# Patient Record
Sex: FEMALE | Race: WHITE | NOT HISPANIC OR LATINO | Employment: FULL TIME | URBAN - METROPOLITAN AREA
[De-identification: names, ages, dates, MRNs, and addresses within clinical notes are randomized per-mention and may not be internally consistent; named-entity substitution may affect disease eponyms.]

---

## 2019-08-21 ENCOUNTER — APPOINTMENT (OUTPATIENT)
Dept: CT IMAGING | Facility: CLINIC | Age: 24
End: 2019-08-21
Attending: EMERGENCY MEDICINE
Payer: COMMERCIAL

## 2019-08-21 ENCOUNTER — HOSPITAL ENCOUNTER (EMERGENCY)
Facility: CLINIC | Age: 24
Discharge: HOME | End: 2019-08-21
Attending: EMERGENCY MEDICINE | Admitting: EMERGENCY MEDICINE
Payer: COMMERCIAL

## 2019-08-21 VITALS
TEMPERATURE: 96.5 F | SYSTOLIC BLOOD PRESSURE: 118 MMHG | OXYGEN SATURATION: 100 % | DIASTOLIC BLOOD PRESSURE: 65 MMHG | HEART RATE: 68 BPM | WEIGHT: 195 LBS | RESPIRATION RATE: 16 BRPM
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
## 2019-08-21 PROCEDURE — 70450 CT HEAD/BRAIN W/O DYE: CPT

## 2019-08-21 PROCEDURE — 99284 EMERGENCY DEPT VISIT MOD MDM: CPT | Mod: 25 | Performed by: EMERGENCY MEDICINE

## 2019-08-21 PROCEDURE — 72125 CT NECK SPINE W/O DYE: CPT

## 2019-08-21 PROCEDURE — 99284 EMERGENCY DEPT VISIT MOD MDM: CPT | Mod: Z6 | Performed by: EMERGENCY MEDICINE

## 2019-08-21 RX ORDER — CLONAZEPAM 1 MG/1
1 TABLET ORAL DAILY PRN
COMMUNITY
End: 2024-04-25

## 2019-08-21 RX ORDER — CETIRIZINE HYDROCHLORIDE 10 MG/1
10 TABLET ORAL DAILY
COMMUNITY

## 2019-08-21 RX ORDER — MONTELUKAST SODIUM 10 MG/1
10 TABLET ORAL AT BEDTIME
COMMUNITY
End: 2024-04-25

## 2019-08-21 RX ORDER — ALBUTEROL SULFATE 90 UG/1
2 AEROSOL, METERED RESPIRATORY (INHALATION) EVERY 6 HOURS PRN
COMMUNITY

## 2019-08-21 RX ORDER — SERTRALINE HYDROCHLORIDE 100 MG/1
100 TABLET, FILM COATED ORAL DAILY
COMMUNITY
End: 2022-06-13

## 2019-08-21 ASSESSMENT — ENCOUNTER SYMPTOMS
ABDOMINAL PAIN: 0
ARTHRALGIAS: 0
DIFFICULTY URINATING: 0
LIGHT-HEADEDNESS: 1
SHORTNESS OF BREATH: 0
DIZZINESS: 0
CONFUSION: 0
NECK PAIN: 1
EYE REDNESS: 0
NECK STIFFNESS: 1
HEADACHES: 1
COLOR CHANGE: 0
FEVER: 0

## 2019-08-21 NOTE — DISCHARGE INSTRUCTIONS
Please make an appointment to follow up with [facility] (phone: [phone]) as soon as possible even if entirely better.  Return to the ED if you develop any numbness/tingling/weakness in your extremities or any worsening neck pain.
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
## 2019-09-09 ENCOUNTER — PATIENT OUTREACH (OUTPATIENT)
Dept: NEUROSURGERY | Facility: CLINIC | Age: 24
End: 2019-09-09
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
## 2019-09-09 NOTE — PROGRESS NOTES
OSMANI Wallace  called @  2103 stating patient canceled appointment, wanting to be seen sooner and calling another clinic.     Callback to patient, offered appointment with TABITHA Real tomorrow.  Pt refuses appointment states wants to see an MD.    Pt again has my name and number if needed.

## 2020-03-02 ENCOUNTER — HOSPITAL ENCOUNTER (EMERGENCY)
Facility: CLINIC | Age: 25
Discharge: HOME | End: 2020-03-02
Attending: FAMILY MEDICINE | Admitting: FAMILY MEDICINE
Payer: COMMERCIAL

## 2020-03-02 VITALS
SYSTOLIC BLOOD PRESSURE: 126 MMHG | TEMPERATURE: 99.3 F | HEART RATE: 73 BPM | DIASTOLIC BLOOD PRESSURE: 80 MMHG | RESPIRATION RATE: 18 BRPM | OXYGEN SATURATION: 98 %

## 2020-03-02 DIAGNOSIS — A49.8 SHIGA TOXIN-PRODUCING ESCHERICHIA COLI INFECTION: ICD-10-CM

## 2020-03-02 LAB
ALBUMIN SERPL-MCNC: 3.9 G/DL (ref 3.4–5)
ALBUMIN UR-MCNC: NEGATIVE MG/DL
ALP SERPL-CCNC: 68 U/L (ref 40–150)
ALT SERPL W P-5'-P-CCNC: 27 U/L (ref 0–50)
ANION GAP SERPL CALCULATED.3IONS-SCNC: 5 MMOL/L (ref 3–14)
APPEARANCE UR: CLEAR
APTT PPP: 30 SEC (ref 22–37)
AST SERPL W P-5'-P-CCNC: 14 U/L (ref 0–45)
BACTERIA #/AREA URNS HPF: ABNORMAL /HPF
BASOPHILS # BLD AUTO: 0 10E9/L (ref 0–0.2)
BASOPHILS NFR BLD AUTO: 0.1 %
BILIRUB SERPL-MCNC: 0.4 MG/DL (ref 0.2–1.3)
BILIRUB UR QL STRIP: NEGATIVE
BUN SERPL-MCNC: 6 MG/DL (ref 7–30)
CALCIUM SERPL-MCNC: 8.8 MG/DL (ref 8.5–10.1)
CHLORIDE SERPL-SCNC: 106 MMOL/L (ref 94–109)
CO2 SERPL-SCNC: 29 MMOL/L (ref 20–32)
COLOR UR AUTO: ABNORMAL
CREAT SERPL-MCNC: 0.67 MG/DL (ref 0.52–1.04)
DIFFERENTIAL METHOD BLD: NORMAL
EOSINOPHIL # BLD AUTO: 0.1 10E9/L (ref 0–0.7)
EOSINOPHIL NFR BLD AUTO: 0.8 %
ERYTHROCYTE [DISTWIDTH] IN BLOOD BY AUTOMATED COUNT: 12.4 % (ref 10–15)
GFR SERPL CREATININE-BSD FRML MDRD: >90 ML/MIN/{1.73_M2}
GLUCOSE SERPL-MCNC: 86 MG/DL (ref 70–99)
GLUCOSE UR STRIP-MCNC: NEGATIVE MG/DL
HCG UR QL: NEGATIVE
HCT VFR BLD AUTO: 42.2 % (ref 35–47)
HGB BLD-MCNC: 14.3 G/DL (ref 11.7–15.7)
HGB UR QL STRIP: NEGATIVE
IMM GRANULOCYTES # BLD: 0 10E9/L (ref 0–0.4)
IMM GRANULOCYTES NFR BLD: 0.2 %
INR PPP: 1.02 (ref 0.86–1.14)
KETONES UR STRIP-MCNC: NEGATIVE MG/DL
LEUKOCYTE ESTERASE UR QL STRIP: ABNORMAL
LYMPHOCYTES # BLD AUTO: 1.4 10E9/L (ref 0.8–5.3)
LYMPHOCYTES NFR BLD AUTO: 15.4 %
MCH RBC QN AUTO: 30.6 PG (ref 26.5–33)
MCHC RBC AUTO-ENTMCNC: 33.9 G/DL (ref 31.5–36.5)
MCV RBC AUTO: 90 FL (ref 78–100)
MONOCYTES # BLD AUTO: 0.9 10E9/L (ref 0–1.3)
MONOCYTES NFR BLD AUTO: 9.7 %
NEUTROPHILS # BLD AUTO: 6.7 10E9/L (ref 1.6–8.3)
NEUTROPHILS NFR BLD AUTO: 73.8 %
NITRATE UR QL: NEGATIVE
NRBC # BLD AUTO: 0 10*3/UL
NRBC BLD AUTO-RTO: 0 /100
PH UR STRIP: 5.5 PH (ref 5–7)
PLATELET # BLD AUTO: 208 10E9/L (ref 150–450)
POTASSIUM SERPL-SCNC: 3.9 MMOL/L (ref 3.4–5.3)
PROT SERPL-MCNC: 7.2 G/DL (ref 6.8–8.8)
RBC # BLD AUTO: 4.68 10E12/L (ref 3.8–5.2)
RBC #/AREA URNS AUTO: 2 /HPF (ref 0–2)
SODIUM SERPL-SCNC: 140 MMOL/L (ref 133–144)
SOURCE: ABNORMAL
SP GR UR STRIP: 1 (ref 1–1.03)
SQUAMOUS #/AREA URNS AUTO: 3 /HPF (ref 0–1)
UROBILINOGEN UR STRIP-MCNC: NORMAL MG/DL (ref 0–2)
WBC # BLD AUTO: 9.1 10E9/L (ref 4–11)
WBC #/AREA URNS AUTO: 2 /HPF (ref 0–5)

## 2020-03-02 PROCEDURE — 25000128 H RX IP 250 OP 636: Performed by: FAMILY MEDICINE

## 2020-03-02 PROCEDURE — 99285 EMERGENCY DEPT VISIT HI MDM: CPT | Mod: Z6 | Performed by: FAMILY MEDICINE

## 2020-03-02 PROCEDURE — 85730 THROMBOPLASTIN TIME PARTIAL: CPT | Performed by: FAMILY MEDICINE

## 2020-03-02 PROCEDURE — 25000132 ZZH RX MED GY IP 250 OP 250 PS 637: Performed by: FAMILY MEDICINE

## 2020-03-02 PROCEDURE — 96376 TX/PRO/DX INJ SAME DRUG ADON: CPT | Performed by: FAMILY MEDICINE

## 2020-03-02 PROCEDURE — 81025 URINE PREGNANCY TEST: CPT | Performed by: FAMILY MEDICINE

## 2020-03-02 PROCEDURE — 96375 TX/PRO/DX INJ NEW DRUG ADDON: CPT | Performed by: FAMILY MEDICINE

## 2020-03-02 PROCEDURE — 96361 HYDRATE IV INFUSION ADD-ON: CPT | Performed by: FAMILY MEDICINE

## 2020-03-02 PROCEDURE — 99285 EMERGENCY DEPT VISIT HI MDM: CPT | Mod: 25 | Performed by: FAMILY MEDICINE

## 2020-03-02 PROCEDURE — 80053 COMPREHEN METABOLIC PANEL: CPT | Performed by: FAMILY MEDICINE

## 2020-03-02 PROCEDURE — 81001 URINALYSIS AUTO W/SCOPE: CPT | Performed by: FAMILY MEDICINE

## 2020-03-02 PROCEDURE — 85025 COMPLETE CBC W/AUTO DIFF WBC: CPT | Performed by: FAMILY MEDICINE

## 2020-03-02 PROCEDURE — 96374 THER/PROPH/DIAG INJ IV PUSH: CPT | Performed by: FAMILY MEDICINE

## 2020-03-02 PROCEDURE — 85610 PROTHROMBIN TIME: CPT | Performed by: FAMILY MEDICINE

## 2020-03-02 PROCEDURE — 25800030 ZZH RX IP 258 OP 636: Performed by: FAMILY MEDICINE

## 2020-03-02 RX ORDER — HYDROMORPHONE HYDROCHLORIDE 1 MG/ML
0.3 INJECTION, SOLUTION INTRAMUSCULAR; INTRAVENOUS; SUBCUTANEOUS ONCE
Status: COMPLETED | OUTPATIENT
Start: 2020-03-02 | End: 2020-03-02

## 2020-03-02 RX ORDER — HYDROCODONE BITARTRATE AND ACETAMINOPHEN 5; 325 MG/1; MG/1
1 TABLET ORAL EVERY 4 HOURS PRN
Qty: 12 TABLET | Refills: 0 | Status: SHIPPED | OUTPATIENT
Start: 2020-03-02 | End: 2020-03-05

## 2020-03-02 RX ORDER — SODIUM CHLORIDE 9 MG/ML
1000 INJECTION, SOLUTION INTRAVENOUS CONTINUOUS
Status: DISCONTINUED | OUTPATIENT
Start: 2020-03-02 | End: 2020-03-03 | Stop reason: HOSPADM

## 2020-03-02 RX ORDER — HYDROMORPHONE HYDROCHLORIDE 1 MG/ML
0.5 INJECTION, SOLUTION INTRAMUSCULAR; INTRAVENOUS; SUBCUTANEOUS ONCE
Status: COMPLETED | OUTPATIENT
Start: 2020-03-02 | End: 2020-03-02

## 2020-03-02 RX ORDER — ONDANSETRON 2 MG/ML
4 INJECTION INTRAMUSCULAR; INTRAVENOUS ONCE
Status: COMPLETED | OUTPATIENT
Start: 2020-03-02 | End: 2020-03-02

## 2020-03-02 RX ORDER — HYDROCODONE BITARTRATE AND ACETAMINOPHEN 5; 325 MG/1; MG/1
1 TABLET ORAL ONCE
Status: COMPLETED | OUTPATIENT
Start: 2020-03-02 | End: 2020-03-02

## 2020-03-02 RX ORDER — LIDOCAINE 40 MG/G
CREAM TOPICAL
Status: DISCONTINUED | OUTPATIENT
Start: 2020-03-02 | End: 2020-03-03 | Stop reason: HOSPADM

## 2020-03-02 RX ORDER — ONDANSETRON 4 MG/1
4 TABLET, ORALLY DISINTEGRATING ORAL EVERY 8 HOURS PRN
Qty: 10 TABLET | Refills: 0 | Status: SHIPPED | OUTPATIENT
Start: 2020-03-02 | End: 2020-03-05

## 2020-03-02 RX ADMIN — SODIUM CHLORIDE 1000 ML: 9 INJECTION, SOLUTION INTRAVENOUS at 20:16

## 2020-03-02 RX ADMIN — HYDROMORPHONE HYDROCHLORIDE 0.5 MG: 1 INJECTION, SOLUTION INTRAMUSCULAR; INTRAVENOUS; SUBCUTANEOUS at 21:06

## 2020-03-02 RX ADMIN — SODIUM CHLORIDE 1000 ML: 9 INJECTION, SOLUTION INTRAVENOUS at 21:06

## 2020-03-02 RX ADMIN — HYDROMORPHONE HYDROCHLORIDE 0.3 MG: 1 INJECTION, SOLUTION INTRAMUSCULAR; INTRAVENOUS; SUBCUTANEOUS at 20:16

## 2020-03-02 RX ADMIN — HYDROCODONE BITARTRATE AND ACETAMINOPHEN 1 TABLET: 5; 325 TABLET ORAL at 22:09

## 2020-03-02 RX ADMIN — ONDANSETRON 4 MG: 2 INJECTION INTRAMUSCULAR; INTRAVENOUS at 20:16

## 2020-03-02 ASSESSMENT — ENCOUNTER SYMPTOMS
NAUSEA: 1
CONSTIPATION: 0
FEVER: 0
CONFUSION: 0
SHORTNESS OF BREATH: 0
ANAL BLEEDING: 1
BACK PAIN: 1
HEADACHES: 0
BLOOD IN STOOL: 1
NECK STIFFNESS: 0
CHILLS: 1
DIARRHEA: 1
VOMITING: 1
ARTHRALGIAS: 0
DIFFICULTY URINATING: 0
ABDOMINAL PAIN: 0
COLOR CHANGE: 0
EYE REDNESS: 0

## 2020-03-02 NOTE — ED AVS SNAPSHOT
[facility], [place], Emergency Department  [address]  [facility] [address]  Phone:  [phone]  Fax:  [phone]                                    Magdalene Cohen   MRN: [id number]    Department:  [facility], Emergency Department   Date of Visit:  3/2/2020           After Visit Summary Signature Page    I have received my discharge instructions, and my questions have been answered. I have discussed any challenges I see with this plan with the nurse or doctor.    ..........................................................................................................................................  Patient/Patient Representative Signature      ..........................................................................................................................................  Patient Representative Print Name and Relationship to Patient    ..................................................               ................................................  Date                                   Time    ..........................................................................................................................................  Reviewed by Signature/Title    ...................................................              ..............................................  Date                                               Time          22EPIC Rev 08/18

## 2020-03-03 ASSESSMENT — ENCOUNTER SYMPTOMS
HALLUCINATIONS: 0
APPETITE CHANGE: 1
WEAKNESS: 1
LIGHT-HEADEDNESS: 0
NERVOUS/ANXIOUS: 1
ACTIVITY CHANGE: 1
JOINT SWELLING: 0
BRUISES/BLEEDS EASILY: 0
DYSPHORIC MOOD: 0
DECREASED CONCENTRATION: 0

## 2020-03-03 NOTE — ED PROVIDER NOTES
"    [facility] [place] EMERGENCY DEPARTMENT ([facility])    3/02/20      History     Chief Complaint   Patient presents with     Abdominal Pain     Was at urgent care yesterday for abdominal cramping and diarrhea, called today and told to come here for positive shiga toxin 1 found in stool, pt is \"nervous.     The history is provided by the patient and medical records.     Magdalene Cohen is a 24 year old female with a past medical history of uncomplicated asthma, depressive disorder, and anxiety who presents to the Emergency Department for evaluation of diarrhea.  Per chart review, patient was seen at [facility] urgent care yesterday (3/1/2020) for evaluation of diarrhea as well.  Should be noted that patient stool was positive for Shiga toxin 1 patient was advised to rest, drink a lot of fluids.  Patient had a stool test done during this visit as well.  Patient's diarrhea was presumed to be from infectious origin    Patient presents the ED after 5 days of diarrhea.  Patient states that this initially started as nausea and vomiting, but has developed into watery diarrhea's.  Patient states she ordered takeout [employer] food the night of her symptoms.  Patient states that her roommate ate the same meal, but has had no diarrhea.  Patient states that her vomiting also had a reddish tint to it as well, but is unsure if this was blood.  Patient's diarrhea has also taken on a red tint since Saturday.  Patient also endorses subjective fevers, myalgias, and low back pain.  Patient denies any recent travel.    Past Medical History:   Diagnosis Date     Anxiety      Depressive disorder      Uncomplicated asthma        History reviewed. No pertinent surgical history.    History reviewed. No pertinent family history.    Social History     Tobacco Use     Smoking status: Never Smoker     Smokeless tobacco: Former User   Substance Use Topics     Alcohol use: Yes     Comment: socially       No current facility-administered " medications for this encounter.      Current Outpatient Medications   Medication     albuterol (PROAIR HFA/PROVENTIL HFA/VENTOLIN HFA) 108 (90 Base) MCG/ACT inhaler     cetirizine (ZYRTEC) 10 MG tablet     clonazePAM (KLONOPIN) 1 MG tablet     HYDROcodone-acetaminophen (NORCO) 5-325 MG tablet     montelukast (SINGULAIR) 10 MG tablet     ondansetron (ZOFRAN ODT) 4 MG ODT tab     sertraline (ZOLOFT) 100 MG tablet        Allergies   Allergen Reactions     Iodine Itching     topical     I have reviewed the Medications, Allergies, Past Medical and Surgical History, and Social History in the Epic system.    Review of Systems   Constitutional: Positive for activity change, appetite change and chills. Negative for fever.   HENT: Negative for congestion.    Eyes: Negative for redness.   Respiratory: Negative for shortness of breath.    Cardiovascular: Negative for chest pain.   Gastrointestinal: Positive for anal bleeding, blood in stool, diarrhea, nausea and vomiting. Negative for abdominal pain and constipation.   Genitourinary: Negative for difficulty urinating, vaginal bleeding and vaginal discharge.   Musculoskeletal: Positive for back pain. Negative for arthralgias, joint swelling and neck stiffness.   Skin: Negative for color change and rash.   Allergic/Immunologic: Negative for immunocompromised state.   Neurological: Positive for weakness. Negative for syncope, light-headedness and headaches.   Hematological: Does not bruise/bleed easily.   Psychiatric/Behavioral: Negative for confusion, decreased concentration, dysphoric mood, hallucinations and suicidal ideas. The patient is nervous/anxious.    All other systems reviewed and are negative.      Physical Exam   BP: (!) 143/91  Pulse: 124  Heart Rate: 124  Temp: 99.3  F (37.4  C)  Resp: 16  SpO2: 97 %      Physical Exam  Vitals signs and nursing note reviewed.   Constitutional:       General: She is in acute distress.      Appearance: She is well-developed. She is  not ill-appearing, toxic-appearing or diaphoretic.      Comments: In the ER patient does not appear to be toxic is just mildly anxious but interactive here in the ER.  Is not writhing in pain otherwise   HENT:      Head: Normocephalic and atraumatic.      Mouth/Throat:      Mouth: Mucous membranes are dry.   Eyes:      General: No scleral icterus.     Extraocular Movements: Extraocular movements intact.      Pupils: Pupils are equal, round, and reactive to light.   Neck:      Musculoskeletal: Normal range of motion and neck supple.   Cardiovascular:      Rate and Rhythm: Regular rhythm. Tachycardia present.   Pulmonary:      Effort: No respiratory distress.      Breath sounds: No stridor.   Abdominal:      General: There is no distension.      Palpations: Abdomen is soft. There is no mass.      Tenderness: There is abdominal tenderness. There is no guarding or rebound.      Hernia: No hernia is present.      Comments: Abdomen is soft just minimal tenderness left lower quadrant   Musculoskeletal:         General: No swelling or tenderness.   Skin:     General: Skin is warm and dry.      Capillary Refill: Capillary refill takes less than 2 seconds.      Coloration: Skin is not jaundiced or pale.      Findings: No erythema or rash.   Neurological:      General: No focal deficit present.      Mental Status: She is alert and oriented to person, place, and time. Mental status is at baseline.   Psychiatric:      Comments: Mild anxiousness otherwise appropriate response to the ER.  Not suicidal not homicidal not delusional         ED Course     Medications   0.9% sodium chloride BOLUS (0 mLs Intravenous Stopped 3/2/20 2209)   HYDROmorphone (PF) (DILAUDID) injection 0.3 mg (0.3 mg Intravenous Given 3/2/20 2016)   ondansetron (ZOFRAN) injection 4 mg (4 mg Intravenous Given 3/2/20 2016)   HYDROmorphone (PF) (DILAUDID) injection 0.5 mg (0.5 mg Intravenous Given 3/2/20 2106)   HYDROcodone-acetaminophen (NORCO) 5-325 MG per  tablet 1 tablet (1 tablet Oral Given 3/2/20 2209)        Procedures         In the ER patient evaluated.  Records reviewed as noted she can toxin 1 contacted yesterday and had stool study.  Patient IV established IV fluids given 2 L here in the ER.  Zofran for nausea Dilaudid for pain and Zofran for nausea.  As noted renal function within normal limits CBC chemistries otherwise stable.  Urinalysis negative.  Pregnancy negative.    Patient given 1 dose of Vicodin also is improved at this point discussed case with GI feel at this point supportive care no antibiotics are indicated at this point.  Patient without signs any hemolytic uremic syndrome.    As noted Shiga toxin 1 most likely consistent with E. coli infection.  Typically should be self-limiting patient reassured recommended to follow-up with MD for recheck and otherwise supportive care sent out with limited supply of Vicodin also.  Patient agrees with plan feeling better.                    Results for orders placed or performed during the hospital encounter of 03/02/20   CBC with platelets differential     Status: None   Result Value Ref Range    WBC 9.1 4.0 - 11.0 10e9/L    RBC Count 4.68 3.8 - 5.2 10e12/L    Hemoglobin 14.3 11.7 - 15.7 g/dL    Hematocrit 42.2 35.0 - 47.0 %    MCV 90 78 - 100 fl    MCH 30.6 26.5 - 33.0 pg    MCHC 33.9 31.5 - 36.5 g/dL    RDW 12.4 10.0 - 15.0 %    Platelet Count 208 150 - 450 10e9/L    Diff Method Automated Method     % Neutrophils 73.8 %    % Lymphocytes 15.4 %    % Monocytes 9.7 %    % Eosinophils 0.8 %    % Basophils 0.1 %    % Immature Granulocytes 0.2 %    Nucleated RBCs 0 0 /100    Absolute Neutrophil 6.7 1.6 - 8.3 10e9/L    Absolute Lymphocytes 1.4 0.8 - 5.3 10e9/L    Absolute Monocytes 0.9 0.0 - 1.3 10e9/L    Absolute Eosinophils 0.1 0.0 - 0.7 10e9/L    Absolute Basophils 0.0 0.0 - 0.2 10e9/L    Abs Immature Granulocytes 0.0 0 - 0.4 10e9/L    Absolute Nucleated RBC 0.0    INR     Status: None   Result Value Ref Range     INR 1.02 0.86 - 1.14   Partial thromboplastin time     Status: None   Result Value Ref Range    PTT 30 22 - 37 sec   Comprehensive metabolic panel     Status: Abnormal   Result Value Ref Range    Sodium 140 133 - 144 mmol/L    Potassium 3.9 3.4 - 5.3 mmol/L    Chloride 106 94 - 109 mmol/L    Carbon Dioxide 29 20 - 32 mmol/L    Anion Gap 5 3 - 14 mmol/L    Glucose 86 70 - 99 mg/dL    Urea Nitrogen 6 (L) 7 - 30 mg/dL    Creatinine 0.67 0.52 - 1.04 mg/dL    GFR Estimate >90 >60 mL/min/[1.73_m2]    GFR Estimate If Black >90 >60 mL/min/[1.73_m2]    Calcium 8.8 8.5 - 10.1 mg/dL    Bilirubin Total 0.4 0.2 - 1.3 mg/dL    Albumin 3.9 3.4 - 5.0 g/dL    Protein Total 7.2 6.8 - 8.8 g/dL    Alkaline Phosphatase 68 40 - 150 U/L    ALT 27 0 - 50 U/L    AST 14 0 - 45 U/L   HCG qualitative urine     Status: None   Result Value Ref Range    HCG Qual Urine Negative NEG^Negative   UA with Microscopic reflex to Culture     Status: Abnormal   Result Value Ref Range    Color Urine Light Yellow     Appearance Urine Clear     Glucose Urine Negative NEG^Negative mg/dL    Bilirubin Urine Negative NEG^Negative    Ketones Urine Negative NEG^Negative mg/dL    Specific Gravity Urine 1.004 1.003 - 1.035    Blood Urine Negative NEG^Negative    pH Urine 5.5 5.0 - 7.0 pH    Protein Albumin Urine Negative NEG^Negative mg/dL    Urobilinogen mg/dL Normal 0.0 - 2.0 mg/dL    Nitrite Urine Negative NEG^Negative    Leukocyte Esterase Urine Small (A) NEG^Negative    Source Midstream Urine     WBC Urine 2 0 - 5 /HPF    RBC Urine 2 0 - 2 /HPF    Bacteria Urine Few (A) NEG^Negative /HPF    Squamous Epithelial /HPF Urine 3 (H) 0 - 1 /HPF         Labs Ordered and Resulted from Time of ED Arrival Up to the Time of Departure from the ED   COMPREHENSIVE METABOLIC PANEL - Abnormal; Notable for the following components:       Result Value    Urea Nitrogen 6 (*)     All other components within normal limits   ROUTINE UA WITH MICROSCOPIC REFLEX TO CULTURE - Abnormal;  Notable for the following components:    Leukocyte Esterase Urine Small (*)     Bacteria Urine Few (*)     Squamous Epithelial /HPF Urine 3 (*)     All other components within normal limits   CBC WITH PLATELETS DIFFERENTIAL   INR   PARTIAL THROMBOPLASTIN TIME   HCG QUALITATIVE URINE   PERIPHERAL IV CATHETER     Results for orders placed or performed during the hospital encounter of 03/02/20 (from the past 24 hour(s))   CBC with platelets differential   Result Value Ref Range    WBC 9.1 4.0 - 11.0 10e9/L    RBC Count 4.68 3.8 - 5.2 10e12/L    Hemoglobin 14.3 11.7 - 15.7 g/dL    Hematocrit 42.2 35.0 - 47.0 %    MCV 90 78 - 100 fl    MCH 30.6 26.5 - 33.0 pg    MCHC 33.9 31.5 - 36.5 g/dL    RDW 12.4 10.0 - 15.0 %    Platelet Count 208 150 - 450 10e9/L    Diff Method Automated Method     % Neutrophils 73.8 %    % Lymphocytes 15.4 %    % Monocytes 9.7 %    % Eosinophils 0.8 %    % Basophils 0.1 %    % Immature Granulocytes 0.2 %    Nucleated RBCs 0 0 /100    Absolute Neutrophil 6.7 1.6 - 8.3 10e9/L    Absolute Lymphocytes 1.4 0.8 - 5.3 10e9/L    Absolute Monocytes 0.9 0.0 - 1.3 10e9/L    Absolute Eosinophils 0.1 0.0 - 0.7 10e9/L    Absolute Basophils 0.0 0.0 - 0.2 10e9/L    Abs Immature Granulocytes 0.0 0 - 0.4 10e9/L    Absolute Nucleated RBC 0.0    INR   Result Value Ref Range    INR 1.02 0.86 - 1.14   Partial thromboplastin time   Result Value Ref Range    PTT 30 22 - 37 sec   Comprehensive metabolic panel   Result Value Ref Range    Sodium 140 133 - 144 mmol/L    Potassium 3.9 3.4 - 5.3 mmol/L    Chloride 106 94 - 109 mmol/L    Carbon Dioxide 29 20 - 32 mmol/L    Anion Gap 5 3 - 14 mmol/L    Glucose 86 70 - 99 mg/dL    Urea Nitrogen 6 (L) 7 - 30 mg/dL    Creatinine 0.67 0.52 - 1.04 mg/dL    GFR Estimate >90 >60 mL/min/[1.73_m2]    GFR Estimate If Black >90 >60 mL/min/[1.73_m2]    Calcium 8.8 8.5 - 10.1 mg/dL    Bilirubin Total 0.4 0.2 - 1.3 mg/dL    Albumin 3.9 3.4 - 5.0 g/dL    Protein Total 7.2 6.8 - 8.8 g/dL     Alkaline Phosphatase 68 40 - 150 U/L    ALT 27 0 - 50 U/L    AST 14 0 - 45 U/L   HCG qualitative urine   Result Value Ref Range    HCG Qual Urine Negative NEG^Negative   UA with Microscopic reflex to Culture   Result Value Ref Range    Color Urine Light Yellow     Appearance Urine Clear     Glucose Urine Negative NEG^Negative mg/dL    Bilirubin Urine Negative NEG^Negative    Ketones Urine Negative NEG^Negative mg/dL    Specific Gravity Urine 1.004 1.003 - 1.035    Blood Urine Negative NEG^Negative    pH Urine 5.5 5.0 - 7.0 pH    Protein Albumin Urine Negative NEG^Negative mg/dL    Urobilinogen mg/dL Normal 0.0 - 2.0 mg/dL    Nitrite Urine Negative NEG^Negative    Leukocyte Esterase Urine Small (A) NEG^Negative    Source Midstream Urine     WBC Urine 2 0 - 5 /HPF    RBC Urine 2 0 - 2 /HPF    Bacteria Urine Few (A) NEG^Negative /HPF    Squamous Epithelial /HPF Urine 3 (H) 0 - 1 /HPF          Assessments & Plan (with Medical Decision Making)  24-year-old female diagnosed with Shiga toxin 1 5 days of ongoing bloody diarrhea.  Vitally patient otherwise stable mild tachycardia given IV fluids for dehydration.  Minimal abdominal tenderness nonsurgical.  Concern at this point as E. coli infection is or signs of any hemolytic uremic syndrome but CBC within normal limits.  Renal function is normal etc.  Discussed with GI also and agree at this point supportive care.  Patient feeling better after fluids here in the ER and reassurance will be discharged with Vicodin for pain control as needed and to follow-up with MD for recheck after symptoms radha return if any concerns at all patient agrees with plan and to be discharged also Zofran.           I have reviewed the nursing notes.    I have reviewed the findings, diagnosis, plan and need for follow up with the patient.    Discharge Medication List as of 3/2/2020 11:50 PM      START taking these medications    Details   HYDROcodone-acetaminophen (NORCO) 5-325 MG tablet Take 1  tablet by mouth every 4 hours as needed for moderate to severe pain, Disp-12 tablet, R-0, Local Print      ondansetron (ZOFRAN ODT) 4 MG ODT tab Take 1 tablet (4 mg) by mouth every 8 hours as needed for nausea or vomiting, Disp-10 tablet, R-0, Local Print             Final diagnoses:   Shiga toxin-producing Escherichia coli infection   I, Christo Do, am serving as a trained medical scribe to document services personally performed by Jose Alfredo King MD, based on the provider's statements to me.      IJose Alfredo MD, was physically present and have reviewed and verified the accuracy of this note documented by Christo Do.     3/2/2020   [facility] EMERGENCY DEPARTMENT    This note was created at least in part by the use of dragon voice dictation system. Inadvertent typographical errors may still exist.  Jose Alfredo King MD.         Jose lAfredo King MD  03/03/20 [id number]

## 2020-03-03 NOTE — DISCHARGE INSTRUCTIONS
Home.  Discussed with GI who felt supportive care.  Your labs look good.  Take the vicodin if needed for pain.  Zofran for nausea.  See MD for a follow up to recheck your symptoms and labs.  Return if any concerns.

## 2020-03-03 NOTE — ED NOTES
"ED discharge call completed. Pt states medication is helping. Plan to schedule follow up appt for next week. \"My careteam was great, Im so thankful for everyone that helped\". No further questions.   "

## 2020-03-05 ENCOUNTER — OFFICE VISIT (OUTPATIENT)
Dept: FAMILY MEDICINE | Facility: CLINIC | Age: 25
End: 2020-03-05
Payer: COMMERCIAL

## 2020-03-05 VITALS
WEIGHT: 194 LBS | TEMPERATURE: 97.7 F | BODY MASS INDEX: 30.45 KG/M2 | DIASTOLIC BLOOD PRESSURE: 68 MMHG | OXYGEN SATURATION: 99 % | HEIGHT: 67 IN | HEART RATE: 108 BPM | RESPIRATION RATE: 18 BRPM | SYSTOLIC BLOOD PRESSURE: 96 MMHG

## 2020-03-05 DIAGNOSIS — A09 INFECTIOUS DIARRHEA IN ADULT PATIENT: Primary | ICD-10-CM

## 2020-03-05 PROCEDURE — 99203 OFFICE O/P NEW LOW 30 MIN: CPT | Performed by: NURSE PRACTITIONER

## 2020-03-05 RX ORDER — OLANZAPINE 5 MG/1
5 TABLET ORAL PRN
COMMUNITY
Start: 2020-02-10 | End: 2022-06-13

## 2020-03-05 RX ORDER — BUPROPION HYDROCHLORIDE 150 MG/1
150 TABLET, EXTENDED RELEASE ORAL DAILY
COMMUNITY
Start: 2020-02-10 | End: 2022-06-13

## 2020-03-05 ASSESSMENT — MIFFLIN-ST. JEOR: SCORE: 1667.61

## 2020-03-05 NOTE — PROGRESS NOTES
Subjective     Magdalene Cohen is a 24 year old female who presents to clinic today for the following health issues:    HPI   ED/UC Followup:    Facility:  [facility] Emergency Department  Date of visit: 3-2-2020  Reason for visit: Abdominal Pain  Current Status: feeling ok, still having pain and feeling crampy. Patient states her stools are less watery and she is also feeling more tired.   pedialyte and bread tolerated okay, applesauce  Stools starting to have more substance and less watery, no vomiting mild nausea from cramping at times but tolerable, tylenol for pain     Does take psych meds and pain pills, asthma no concerns with these today     There is no problem list on file for this patient.    No past surgical history on file.    Social History     Tobacco Use     Smoking status: Never Smoker     Smokeless tobacco: Former User   Substance Use Topics     Alcohol use: Yes     Comment: socially     No family history on file.      Current Outpatient Medications   Medication Sig Dispense Refill     albuterol (PROAIR HFA/PROVENTIL HFA/VENTOLIN HFA) 108 (90 Base) MCG/ACT inhaler Inhale 2 puffs into the lungs every 6 hours as needed for shortness of breath / dyspnea or wheezing       buPROPion (WELLBUTRIN SR) 150 MG 12 hr tablet Take 150 mg by mouth daily       cetirizine (ZYRTEC) 10 MG tablet Take 10 mg by mouth daily       clonazePAM (KLONOPIN) 1 MG tablet Take 1 mg by mouth daily as needed for anxiety       HYDROcodone-acetaminophen (NORCO) 5-325 MG tablet Take 1 tablet by mouth every 4 hours as needed for moderate to severe pain 12 tablet 0     OLANZapine (ZYPREXA) 5 MG tablet Take 5 mg by mouth as needed       ondansetron (ZOFRAN ODT) 4 MG ODT tab Take 1 tablet (4 mg) by mouth every 8 hours as needed for nausea or vomiting 10 tablet 0     sertraline (ZOLOFT) 100 MG tablet Take 100 mg by mouth daily       Spacer/Aero-Holding Chambers (VALVED HOLDING CHAMBER) LIBRA        montelukast (SINGULAIR) 10 MG tablet  "Take 10 mg by mouth At Bedtime       Allergies   Allergen Reactions     Iodine Itching     topical     Recent Labs   Lab Test 03/02/20 1952   ALT 27   CR 0.67   GFRESTIMATED >90   GFRESTBLACK >90   POTASSIUM 3.9      BP Readings from Last 3 Encounters:   03/05/20 96/68   03/02/20 126/80   08/21/19 118/65    Wt Readings from Last 3 Encounters:   03/05/20 88 kg (194 lb)   08/21/19 88.5 kg (195 lb)                    Reviewed and updated as needed this visit by Provider         Review of Systems   ROS COMP: Constitutional, HEENT, cardiovascular, pulmonary, gi and gu systems are negative, except as otherwise noted.      Objective    BP 96/68   Pulse 108   Temp 97.7  F (36.5  C) (Oral)   Resp 18   Ht 1.702 m (5' 7\")   Wt 88 kg (194 lb)   LMP 02/14/2020 (Approximate)   SpO2 99%   BMI 30.38 kg/m    Body mass index is 30.38 kg/m .  Physical Exam   GENERAL: healthy, alert and no distress  EYES: Eyes grossly normal to inspection, PERRL and conjunctivae and sclerae normal  HENT: ear canals and TM's normal, nose and mouth without ulcers or lesions  NECK: no adenopathy, no asymmetry, masses, or scars and thyroid normal to palpation  RESP: lungs clear to auscultation - no rales, rhonchi or wheezes  CV: regular rate and rhythm, normal S1 S2, no S3 or S4, no murmur, click or rub, no peripheral edema and peripheral pulses strong  ABDOMEN: soft, nontender, no hepatosplenomegaly, no masses and bowel sounds normal  MS: no gross musculoskeletal defects noted, no edema  SKIN: no suspicious lesions or rashes  NEURO: Normal strength and tone, mentation intact and speech normal  PSYCH: mentation appears normal, affect normal/bright    Diagnostic Test Results:  Labs reviewed in Epic        Assessment & Plan       ICD-10-CM    1. Infectious diarrhea in adult patient A09    improving after likely e coli, discussed in detail advancing diet and when to notify if any concerns     BMI:   Estimated body mass index is 30.38 kg/m  as " "calculated from the following:    Height as of this encounter: 1.702 m (5' 7\").    Weight as of this encounter: 88 kg (194 lb).   Weight management plan: Patient was referred to their PCP to discuss a diet and exercise plan.        Patient Instructions   We will plan on doing blood work at your health maintenance exam (come fasting so nothing to eat or drink other than water for 8-10 hours)      Patient Education     [place] Diet  Your healthcare provider may recommend a bland diet if you have an upset stomach. It consists of foods that are mild and easy to digest. It is better to eat small frequent meals rather than 3 large meals a day.    Beverages  OK: Fruit juices, non-caffeinated teas and coffee, non-carbonated schmitt  Avoid: Carbonated beverage, caffeinated tea and coffee, all alcoholic beverages  Bread  OK: Refined white, wheat or rye bread, nikhil or soda crackers, [place] toast, plain rolls, bagels  Avoid: Whole-grain bread  Cereal  OK: Refined cereals: cooked or ready to eat  Avoid: Whole-grain cereals and granola, or those containing bran, seeds or nuts  Desserts  OK: Peanut butter and all others except those to \"avoid\"  Avoid: Chocolate, cocoa, coconut, popcorn, nuts, seeds, jam, marmalade  Fruits  OK: Canned, cooked, frozen or fresh fruits without seeds or tough skin  Avoid: Olives, skin and seeds of fruit, dried fruit  Meats  OK: All fresh or preserved meat, fish and fowl  Avoid: Any that are prepared with those spices to \"avoid\"  Cheese and eggs  OK: Eggs, cottage cheese, cream cheese, other cheeses  Avoid: All cheeses made with those spices to \"avoid\"  Potatoes and pasta  OK: Potato, rice, macaroni, noodles, spaghetti  Avoid: None  Soups  OK: All soups without heavy seasoning  Avoid: Soups made with those spices to \"avoid\"  Vegetables  OK: Canned, cooked, fresh or frozen mildly flavored vegetables without seeds, skins or coarse fiber  Avoid: Vegetables prepared with those spices to \"avoid\"; skin and " seeds of vegetables and those with coarse fiber, broccoli, cabbage, cauliflower, cucumber, green peppers, and corn  Spices  OK: Salt, lemon and limejuice, vinegar, all extracts, marco, cinnamon, thyme, mace, allspice, paprika  Avoid: Chili powder, cloves, pepper, seed spices, garlic, gravy pickles, highly seasoned salad dressings  Date Last Reviewed: 5/1/2018 2000-2019 [employer] [address] [place] [address]. All rights reserved. This information is not intended as a substitute for professional medical care. Always follow your healthcare professional's instructions.               Return in about 1 week (around 3/12/2020) for next annual exam or as needed, Lab Work.    GENEVIEVE Moreno [facility]

## 2020-03-05 NOTE — PATIENT INSTRUCTIONS
"We will plan on doing blood work at your health maintenance exam (come fasting so nothing to eat or drink other than water for 8-10 hours)      Patient Education     [place] Diet  Your healthcare provider may recommend a bland diet if you have an upset stomach. It consists of foods that are mild and easy to digest. It is better to eat small frequent meals rather than 3 large meals a day.    Beverages  OK: Fruit juices, non-caffeinated teas and coffee, non-carbonated schmitt  Avoid: Carbonated beverage, caffeinated tea and coffee, all alcoholic beverages  Bread  OK: Refined white, wheat or rye bread, nikhil or soda crackers, [place] toast, plain rolls, bagels  Avoid: Whole-grain bread  Cereal  OK: Refined cereals: cooked or ready to eat  Avoid: Whole-grain cereals and granola, or those containing bran, seeds or nuts  Desserts  OK: Peanut butter and all others except those to \"avoid\"  Avoid: Chocolate, cocoa, coconut, popcorn, nuts, seeds, jam, marmalade  Fruits  OK: Canned, cooked, frozen or fresh fruits without seeds or tough skin  Avoid: Olives, skin and seeds of fruit, dried fruit  Meats  OK: All fresh or preserved meat, fish and fowl  Avoid: Any that are prepared with those spices to \"avoid\"  Cheese and eggs  OK: Eggs, cottage cheese, cream cheese, other cheeses  Avoid: All cheeses made with those spices to \"avoid\"  Potatoes and pasta  OK: Potato, rice, macaroni, noodles, spaghetti  Avoid: None  Soups  OK: All soups without heavy seasoning  Avoid: Soups made with those spices to \"avoid\"  Vegetables  OK: Canned, cooked, fresh or frozen mildly flavored vegetables without seeds, skins or coarse fiber  Avoid: Vegetables prepared with those spices to \"avoid\"; skin and seeds of vegetables and those with coarse fiber, broccoli, cabbage, cauliflower, cucumber, green peppers, and corn  Spices  OK: Salt, lemon and limejuice, vinegar, all extracts, marco, cinnamon, thyme, mace, allspice, paprika  Avoid: Chili powder, cloves, " pepper, seed spices, garlic, gravy pickles, highly seasoned salad dressings  Date Last Reviewed: 5/1/2018 2000-2019 [employer]. [address] [place] [address], [place] [address]. All rights reserved. This information is not intended as a substitute for professional medical care. Always follow your healthcare professional's instructions.

## 2020-03-10 ENCOUNTER — HEALTH MAINTENANCE LETTER (OUTPATIENT)
Age: 25
End: 2020-03-10

## 2020-11-30 ENCOUNTER — TRANSCRIBE ORDERS (OUTPATIENT)
Dept: OTHER | Age: 25
End: 2020-11-30

## 2020-11-30 DIAGNOSIS — H93.25 AUDITORY PROCESSING DISORDER: Primary | ICD-10-CM

## 2020-12-27 ENCOUNTER — HEALTH MAINTENANCE LETTER (OUTPATIENT)
Age: 25
End: 2020-12-27

## 2021-04-24 ENCOUNTER — HEALTH MAINTENANCE LETTER (OUTPATIENT)
Age: 26
End: 2021-04-24

## 2021-10-04 ENCOUNTER — HEALTH MAINTENANCE LETTER (OUTPATIENT)
Age: 26
End: 2021-10-04

## 2022-05-15 ENCOUNTER — HEALTH MAINTENANCE LETTER (OUTPATIENT)
Age: 27
End: 2022-05-15

## 2022-06-12 ENCOUNTER — HOSPITAL ENCOUNTER (EMERGENCY)
Facility: CLINIC | Age: 27
Discharge: HOME | End: 2022-06-13
Attending: EMERGENCY MEDICINE | Admitting: EMERGENCY MEDICINE
Payer: COMMERCIAL

## 2022-06-12 DIAGNOSIS — F60.3 BORDERLINE PERSONALITY DISORDER (H): ICD-10-CM

## 2022-06-12 DIAGNOSIS — R45.851 SUICIDAL IDEATION: ICD-10-CM

## 2022-06-12 DIAGNOSIS — F43.10 PTSD (POST-TRAUMATIC STRESS DISORDER): ICD-10-CM

## 2022-06-12 DIAGNOSIS — F33.2 SEVERE EPISODE OF RECURRENT MAJOR DEPRESSIVE DISORDER, WITHOUT PSYCHOTIC FEATURES (H): ICD-10-CM

## 2022-06-12 PROBLEM — R87.610 ASCUS OF CERVIX WITH NEGATIVE HIGH RISK HPV: Status: ACTIVE | Noted: 2022-01-14

## 2022-06-12 PROBLEM — F31.81 BIPOLAR II DISORDER (H): Status: ACTIVE | Noted: 2022-04-08

## 2022-06-12 PROBLEM — F41.8 DEPRESSION WITH ANXIETY: Status: ACTIVE | Noted: 2019-02-26

## 2022-06-12 LAB — SARS-COV-2 RNA RESP QL NAA+PROBE: NEGATIVE

## 2022-06-12 PROCEDURE — U0003 INFECTIOUS AGENT DETECTION BY NUCLEIC ACID (DNA OR RNA); SEVERE ACUTE RESPIRATORY SYNDROME CORONAVIRUS 2 (SARS-COV-2) (CORONAVIRUS DISEASE [COVID-19]), AMPLIFIED PROBE TECHNIQUE, MAKING USE OF HIGH THROUGHPUT TECHNOLOGIES AS DESCRIBED BY CMS-2020-01-R: HCPCS | Performed by: EMERGENCY MEDICINE

## 2022-06-12 PROCEDURE — C9803 HOPD COVID-19 SPEC COLLECT: HCPCS

## 2022-06-12 PROCEDURE — 99285 EMERGENCY DEPT VISIT HI MDM: CPT | Mod: 25

## 2022-06-12 PROCEDURE — 93005 ELECTROCARDIOGRAM TRACING: CPT

## 2022-06-12 RX ORDER — LAMOTRIGINE 100 MG/1
100 TABLET ORAL DAILY
COMMUNITY
Start: 2022-04-02 | End: 2022-06-13

## 2022-06-12 RX ORDER — BUSPIRONE HYDROCHLORIDE 10 MG/1
TABLET ORAL
COMMUNITY
Start: 2022-05-06 | End: 2022-06-13

## 2022-06-13 VITALS
RESPIRATION RATE: 16 BRPM | HEART RATE: 62 BPM | DIASTOLIC BLOOD PRESSURE: 81 MMHG | HEIGHT: 68 IN | SYSTOLIC BLOOD PRESSURE: 127 MMHG | WEIGHT: 200 LBS | OXYGEN SATURATION: 100 % | BODY MASS INDEX: 30.31 KG/M2 | TEMPERATURE: 98.6 F

## 2022-06-13 PROCEDURE — 99205 OFFICE O/P NEW HI 60 MIN: CPT | Performed by: PSYCHIATRY & NEUROLOGY

## 2022-06-13 PROCEDURE — 90791 PSYCH DIAGNOSTIC EVALUATION: CPT

## 2022-06-13 PROCEDURE — 250N000013 HC RX MED GY IP 250 OP 250 PS 637: Performed by: EMERGENCY MEDICINE

## 2022-06-13 PROCEDURE — 250N000013 HC RX MED GY IP 250 OP 250 PS 637: Performed by: PSYCHIATRY & NEUROLOGY

## 2022-06-13 RX ORDER — ZOLPIDEM TARTRATE 5 MG/1
5 TABLET ORAL
COMMUNITY
End: 2024-04-25

## 2022-06-13 RX ORDER — LAMOTRIGINE 100 MG/1
300 TABLET ORAL DAILY
Refills: 0 | COMMUNITY
Start: 2022-06-13 | End: 2024-09-20

## 2022-06-13 RX ORDER — TRIFLUOPERAZINE HYDROCHLORIDE 2 MG/1
2-4 TABLET, FILM COATED ORAL 2 TIMES DAILY PRN
Qty: 15 TABLET | Refills: 0 | Status: SHIPPED | OUTPATIENT
Start: 2022-06-13 | End: 2024-04-25

## 2022-06-13 RX ORDER — CLONAZEPAM 1 MG/1
1 TABLET ORAL DAILY PRN
Status: DISCONTINUED | OUTPATIENT
Start: 2022-06-13 | End: 2022-06-13 | Stop reason: HOSPADM

## 2022-06-13 RX ORDER — LAMOTRIGINE 100 MG/1
100 TABLET ORAL DAILY
Status: DISCONTINUED | OUTPATIENT
Start: 2022-06-13 | End: 2022-06-13 | Stop reason: HOSPADM

## 2022-06-13 RX ORDER — VENLAFAXINE HYDROCHLORIDE 225 MG/1
225 TABLET, EXTENDED RELEASE ORAL DAILY
Status: DISCONTINUED | OUTPATIENT
Start: 2022-06-13 | End: 2022-06-13

## 2022-06-13 RX ORDER — BUPROPION HYDROCHLORIDE 150 MG/1
150 TABLET ORAL EVERY MORNING
Qty: 30 TABLET | Refills: 0 | Status: SHIPPED | OUTPATIENT
Start: 2022-06-13 | End: 2024-04-25

## 2022-06-13 RX ORDER — BUPROPION HYDROCHLORIDE 150 MG/1
150 TABLET, EXTENDED RELEASE ORAL DAILY
Status: DISCONTINUED | OUTPATIENT
Start: 2022-06-13 | End: 2022-06-13 | Stop reason: HOSPADM

## 2022-06-13 RX ORDER — ZOLPIDEM TARTRATE 5 MG/1
5 TABLET ORAL
Status: DISCONTINUED | OUTPATIENT
Start: 2022-06-13 | End: 2022-06-13 | Stop reason: HOSPADM

## 2022-06-13 RX ORDER — BUSPIRONE HYDROCHLORIDE 10 MG/1
10 TABLET ORAL 2 TIMES DAILY
Status: DISCONTINUED | OUTPATIENT
Start: 2022-06-13 | End: 2022-06-13 | Stop reason: HOSPADM

## 2022-06-13 RX ORDER — IBUPROFEN 400 MG/1
800 TABLET, FILM COATED ORAL ONCE
Status: COMPLETED | OUTPATIENT
Start: 2022-06-13 | End: 2022-06-13

## 2022-06-13 RX ORDER — VENLAFAXINE HYDROCHLORIDE 225 MG/1
225 TABLET, EXTENDED RELEASE ORAL DAILY
COMMUNITY
End: 2024-04-25

## 2022-06-13 RX ORDER — CLONAZEPAM 1 MG/1
1 TABLET ORAL AT BEDTIME
Status: DISCONTINUED | OUTPATIENT
Start: 2022-06-13 | End: 2022-06-13 | Stop reason: HOSPADM

## 2022-06-13 RX ADMIN — BUPROPION HYDROCHLORIDE 150 MG: 150 TABLET, EXTENDED RELEASE ORAL at 10:37

## 2022-06-13 RX ADMIN — CLONAZEPAM 1 MG: 1 TABLET ORAL at 11:05

## 2022-06-13 RX ADMIN — ZOLPIDEM TARTRATE 5 MG: 5 TABLET ORAL at 03:14

## 2022-06-13 RX ADMIN — LAMOTRIGINE 100 MG: 100 TABLET ORAL at 09:39

## 2022-06-13 RX ADMIN — CLONAZEPAM 1 MG: 1 TABLET ORAL at 03:13

## 2022-06-13 RX ADMIN — IBUPROFEN 800 MG: 400 TABLET, FILM COATED ORAL at 04:50

## 2022-06-13 RX ADMIN — BUSPIRONE HYDROCHLORIDE 10 MG: 10 TABLET ORAL at 09:39

## 2022-06-13 RX ADMIN — VENLAFAXINE HYDROCHLORIDE 225 MG: 75 CAPSULE, EXTENDED RELEASE ORAL at 09:38

## 2022-06-13 NOTE — PROGRESS NOTES
"Patient very anxious at start of shift, appears tense, sitting in chair with legs curled up to her chest. She reports anxiety increased because another patient had been moving back and forth between sensory room and chair and she was waiting to use sensory room and the indecisiveness of the other patient was causing her anxiety. Breathing techniques, guided imagery, calming activities like journaling encouraged but patient declines. She was reassured and feelings validated. Reports her mood is \"okay\". She states suicidal ideation has lessened while being here and would like to discharge home today and she feels safe to do so. Medication compliant. VSS. She denies psychotic symptoms. Plan to meet with LMHP and psychiatrist today.  "

## 2022-06-13 NOTE — ED NOTES
The following information was received from Tristian whose relationship to the patient is roommate. Information was obtained in person. Their phone number is [phone] and they last had contact with patient on 06/13/2022.    What happened today: Collateral reports that patient had a rough due to family issues. Collateral reports that patient got upset due to their dad being mean because he wanted patient to come and help him move to [place].  Collateral reports that patient refused to help dad move as she was busy. Collateral reports that because of this patient's depression was increasing due to feeling rejected and having a feeling of hopelessness.    What is different about patient's functioning: Collateral reports that patient is having more ups and down and is feeling less emotionally regulated.    Concern about alcohol/drug use: Collateral denies any concerns of patient alcohol and drug use.    What do you think the patient needs: Collateral reports that patient is need of MH stabilization.    Has patient made comments about wanting to kill themselves/others:  Yes Collateral reports that patient has made more of wanting to kill self, but denies patient making comments of wanting to kill others.      If d/c is recommended, can they take part in safety/aftercare planning: Yes Collateral reports that he is willing to help as needed with any safety/aftercare planning.    Other information: N/A

## 2022-06-13 NOTE — ED PROVIDER NOTES
EmPATH Unit - Initial Psychiatric Observation Note  [facility] Emergency Department  Observation Initiation Date: Jun 12, 2022    Magdalene Cohen MRN: [id number]   Age: 26 year old YOB: 1995     History     Chief Complaint   Patient presents with     Suicidal     HPI  Magdalene Cohen is a 26 year old adult with a past history notable for PTSD, borderline personality disorder, major depressive disorder with a rule out of bipolar 2 disorder.  She presents to the emergency room in the midst of an emotional crisis with acute worsening of chronic suicidal thoughts.  She was determined to be medically stable and transferred to the EmPATH unit for psychiatric assessment.  On examination, we reviewed her diagnosis and medication plan.  She explains that she has not been taking BuSpar due to gaining no benefit and experiencing sexual side effects.  She reports developing tolerance to Klonopin over the past 5 years of taking it, tolerating 3 to 5 mg a day without sedation or adequate reduction of her anxiety.  She clarifies that she has been taking 150 mg of lamotrigine daily while adding that a GeneSight test revealed she is a rapid metabolizer of this medication.  She has been hoping to engage in more effective therapies for trauma however has found it difficult to tolerate the triggering content that it entails.  She presents today as help seeking, wanting to review her medications and gather any additional resources that could help in her recovery.  She reports ongoing suicidal thoughts however the intensity has lessened during her time on the unit.  She finds the unit to be triggering as well and anticipates further lessening of this intensity after returning back home.  She anticipates discharge home after our meeting.  There was no indication of homicidal thoughts or psychosis.    Past Medical History  Past Medical History:   Diagnosis Date     Anxiety      Depressive disorder      Uncomplicated  "asthma      No past surgical history on file.  albuterol (PROAIR HFA/PROVENTIL HFA/VENTOLIN HFA) 108 (90 Base) MCG/ACT inhaler  buPROPion (WELLBUTRIN XL) 150 MG 24 hr tablet  cetirizine (ZYRTEC) 10 MG tablet  clonazePAM (KLONOPIN) 1 MG tablet  lamoTRIgine (LAMICTAL) 100 MG tablet  montelukast (SINGULAIR) 10 MG tablet  trifluoperazine (STELAZINE) 2 MG tablet  venlafaxine (EFFEXOR-ER) 225 MG 24 hr tablet  zolpidem (AMBIEN) 5 MG tablet      Allergies   Allergen Reactions     Iodine Itching     topical     Family History  No family history on file.  Social History   Social History     Tobacco Use     Smoking status: Never Smoker     Smokeless tobacco: Former User   Substance Use Topics     Alcohol use: Yes     Comment: socially     Drug use: Yes     Types: Marijuana      Past medical history, past surgical history, medications, allergies, family history, and social history were reviewed with the patient. No additional pertinent items.       Review of Systems  A complete review of systems was performed with pertinent positives and negatives noted in the HPI, and all other systems negative.    Physical Examination   BP: 125/75  Pulse: (!) 148  Temp: 98.2  F (36.8  C)  Resp: 16  Height: 172.7 cm (5' 8\")  Weight: 90.7 kg (200 lb)  SpO2: 98 %    Physical Exam  General: Appears stated age.   Neuro: Alert and fully oriented. Extremities appear to demonstrate normal strength on visual inspection.   Integumentary/Skin: no rash visualized, normal color    Psychiatric Examination   Appearance: awake, alert  Attitude:  cooperative  Eye Contact:  fair  Mood:  depressed  Affect:  intensity is blunted  Speech:  clear, coherent  Psychomotor Behavior:  no evidence of tardive dyskinesia, dystonia, or tics  Thought Process:  logical and linear  Associations:  no loose associations  Thought Content:  no evidence of psychotic thought and passive suicidal ideation present  Insight:  fair  Judgement:  intact  Oriented to:  time, person, and " place  Attention Span and Concentration:  fair  Recent and Remote Memory:  fair  Language: able to name/identify objects without impairment  Fund of Knowledge: intact with awareness of current and past events    ED Course        Labs Ordered and Resulted from Time of ED Arrival to Time of ED Departure   COVID-19 VIRUS (CORONAVIRUS) BY PCR - Normal       Result Value    SARS CoV2 PCR Negative         Assessments & Plan (with Medical Decision Making)   Patient presenting with depressed mood and acute worsening of suicidal ideation. Nursing notes reviewed noting no acute issues.     I have reviewed the assessment completed by the [facility].     During the observation period, the patient did not require medications for agitation, and did not require restraints/seclusion for patient and/or provider safety.     The patient was found to have a psychiatric condition that would benefit from an observation stay in the emergency department for further psychiatric stabilization and/or coordination of a safe disposition. The observation plan includes serial assessments of psychiatric condition, potential administration of medications if indicated, further disposition pending the patient's psychiatric course during the monitoring period.     Preliminary diagnosis:    ICD-10-CM    1. Suicidal ideation  R45.851    2. Severe episode of recurrent major depressive disorder, without psychotic features (H)  F33.2     rule out Bipolar 2 disorder   3. Borderline personality disorder (H)  F60.3    4. PTSD (post-traumatic stress disorder)  F43.10         Treatment Plan:  -Increase lamotrigine from 150 mg to 200 mg daily to better address affective instability.  Noting that the patient may be a rapid metabolizer of this medication, we discussed anticipation of gradually titrating the dose towards 200-300 mg or as tolerated.  She will continue to work with her outpatient provider on dose optimization.  -She will continue her current dose of  Wellbutrin over the next 1 week as she gauges her response to the lamotrigine.  Afterwards, she will begin taking Wellbutrin XR in hopes of achieving a therapeutic steady state versus the shorter acting formulation.  Future dose optimization may also be an option once tolerability and response have been established.  -Continue Klonopin at the current dose for management of anxiety.  She described tolerance and loss of effectiveness.  Once her other scheduled medications provide benefit, utilizing a plan to gradually reduce dosing would be beneficial to lessen the likelihood of rebound anxiety and withdrawal.  -The further aid in treating her acute anxiety, Stelazine will be added at a dose of 2-4 mg twice a day as needed.  Risks and benefits were reviewed.  -Resume outpatient medication management and psychotherapy  -Resources for IOP  -Discharge home today per the patient's request as she does not meet criteria to be placed under a 72-hour hold.    After a period of working with the treatment team on the EmPATH unit, the patient's mental state improved to allow a safe transition to outpatient care. After counseling on the diagnosis, work-up, and treatment plan, the patient was discharged. Close follow-up with a psychiatrist and/or therapist was recommended and community psychiatric resources were provided. Patient is to return to the ED if any urgent or potentially life-threatening concerns.     At the time of discharge, the patient's acute suicide risk was determined to be low due to the following factors: Reduction in the intensity of mood/anxiety symptoms that preceded the admission, denial of active suicidal thoughts, denies feeling helpless or helpless, not currently under the influence of alcohol or illicit substances, denies experiencing command hallucinations, no immediate access to firearms. The patient's acute risk could be higher if noncompliant with their treatment plan, medications, follow-up  appointments or using illicit substances or alcohol. Protective factors include: social supports, stable housing      --  Trae Pina MD   [facility] EMERGENCY DEPT  EmPATH Unit  6/12/2022        Trae Pina MD  06/13/22 1031

## 2022-06-13 NOTE — ED PROVIDER NOTES
"  History   Chief Complaint:  Suicidal       HPI   Magdalene Cohen is a 26 year old adult who presents emergency department with suicidal ideation.  Patient reports suicidal ideation.  No homicidal ideation.  No current drug or alcohol use.  Has been taking medications except for the last few days as has forgotten.  No recent illness.  No cough, fever etc.    Review of Systems  +SI  Denies fever, cough  10 point review of systems was obtained and negative other than mentioned above.    Allergies:  Iodine    Medications:  Albuterol inhaler  Wellbutrin  Buspar  Clonazepam  Singulair  Lamictal  Olanzapine  Zoloft     Past Medical History:     Anxiety   Depressive disorder  Asthma     Past Surgical History:    The patient has no pertinent surgical history.     Family History:    The patient has no known family history.    Social History:  In ED with friend  Physical Exam     Patient Vitals for the past 24 hrs:   BP Temp Temp src Pulse Resp SpO2 Height Weight   06/12/22 2201 125/75 98.2  F (36.8  C) Temporal (!) 148 16 98 % 1.727 m (5' 8\") 90.7 kg (200 lb)       Physical Exam  General: Sitting up in bed  Eyes:  The pupils are equal and round    Conjunctivae and sclerae are normal  ENT:    Wearing a mask  Neck:  Normal range of motion  CV:  Tachycardic rate, regular rhythm    Skin warm and well perfused   Resp:  Non labored breathing on room air    No tachypnea    No cough heard  MS:  Normal muscular tone  Skin:  No rash or acute skin lesions noted  Neuro:   Awake, alert.      Speech is normal and fluent.    Face is symmetric.     Moves all extremities equally  Psych: Normal affect.  Appropriate interactions.    Emergency Department Course     Laboratory:  Labs Ordered and Resulted from Time of ED Arrival to Time of ED Departure   COVID-19 VIRUS (CORONAVIRUS) BY PCR - Normal       Result Value    SARS CoV2 PCR Negative        Emergency Department Course:    Mental Health Risk Assessment      PSS-3    Date and Time " Over the past 2 weeks have you felt down, depressed, or hopeless? Over the past 2 weeks have you had thoughts of killing yourself? Have you ever attempted to kill yourself? When did this last happen? User   06/12/22 2207 yes yes yes -- KML      C-SSRS ([place])    Date and Time Q1 Wished to be Dead (Past Month) Q2 Suicidal Thoughts (Past Month) Q3 Suicidal Thought Method Q4 Suicidal Intent without Specific Plan Q5 Suicide Intent with Specific Plan Q6 Suicide Behavior (Lifetime) Within the Past 3 Months? RETIRED: Level of Risk per Screen Screening Not Complete User   06/12/22 2207 yes yes yes yes yes yes -- -- -- KML        Suicide assessment completed by mental health (D.E.C., LCSW, etc.)    Reviewed:  I reviewed nursing notes, vitals and past medical history    Assessments:   I obtained history and examined the patient as noted above.     Interventions:  Medications   0.9% sodium chloride BOLUS (has no administration in time range)       Disposition:  The patient was discharged to home.     Impression & Plan     Medical Decision Making:  Magdalene Cohen is a 26-year-old who presented emergency department with suicidal ideation.  Patient would like to go to [facility].  Initially was tachycardic but this is significantly improved on recheck.  No recent illness and COVID test was negative. Medically cleared. No overdose and denies attempts at harm.  Patient sent to [facility] for further psychiatric assessment.    Diagnosis:    ICD-10-CM    1. Suicidal ideation  R45.851      Scribe Disclosure:  I, Cassandra Fine, am serving as a scribe on 6/12/2022 at 10:44 PM to personally document services performed by Flaquita Rodriguez MD based on my observations and the provider's statements to me.          Flaquita Rodriguez MD  06/13/22 0157

## 2022-06-13 NOTE — ED NOTES
"[facility] Note:      Writer met with patient in consult room as patient appeared to be panicking, breathing very heavy, and crying loudly in the milieu. Patient was willing to meet with writer in a consult room. Patient initially had difficulty talking or breathing but was able to regulate herself with the help of deep breathing techniques. Patient stated that she was afraid of sleeping in the milieu near men and she wanted to discharge home, however she agreed that she's not safe for discharge in her current emotional state. She talked about feeling like she can't make her parents happy, she hates her coworker and feels like all of the work falls on her, and she stated \"the world is burning, we'll all be gone in 10 years anyway, what's the point?\" Patient spent time with writer talking about how she loves being near water, paddle boarding or diving in lakes in [facility]. After some time patient appeared much more calm and said \"whew, I really freaked out there.\" RN and writer discussed options for changing her chair and RN provided medications. Patient was given the choice of moving to chair 12 or 14 and she chose chair number 12. Staff assured the patient that she is safe in [facility]. Patient was settled in comfortably and she is aware that staff are available to her 24/7.       YAYO Kingsley on 6/13/2022 at 3:38 AM  "
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
## 2022-06-13 NOTE — ED NOTES
26 year old adult with history of anxiety, depression and asthma  received from ED due to suicidal ideation. Patient reports increased anxiety (9.5/10) and depression (9/10) in the last few days. Patient endorses SI with multiple plans and no specific HI. Pt also endorsed SIB in form of cutting themselves and denied hallucinations.      Nursing and risk assessments completed. Assessments reviewed with LMHP. Video monitoring in progress, patient informed.  Admission information reviewed with patient. Patient given a tour of EmPATH and instructions on using the facility. Questions regarding EmPATH addressed. Pt search completed and belongings inventoried.

## 2022-06-13 NOTE — PROGRESS NOTES
Discharge instructions reviewed with patient including follow-up care plan. Educated on medication regimen and advised not to stop prescribed medication without consulting their physician. Reviewed safety plan and outpatient resources. Denies SI. All belongings which where brought into the hospital have been returned to patient. Escorted off the unit at 11:55 AM. Discharged to home in stable condition accompanied by roommate via private car.

## 2022-06-13 NOTE — ED TRIAGE NOTES
patient has been having increase suicidal thoughts in the past few days and coming up with plans on how they would do it. Would drown self in shower, pick a lake to walk into, or hang self, take all meds with alcohol. Stopped taking meds last couple of days. Hx cutting up to 1 month ago.       
no

## 2022-06-13 NOTE — TREATMENT PLAN
EmPATH Treatment Plan    Client's Name: Magdalene Cohen  YOB: 1995    DSM-5 Diagnoses:       296.32 (F33.1) Major Depressive Disorder, Recurrent Episode, Moderate - by history     300.02 (F41.1) Generalized Anxiety Disorder - by history     296.89 Bipolar II Disorder mild    301.83 (F60.3) Borderline Personality Disorder - by history     Psychosocial / Contextual Factors: Patient presented to the emPATH unit with the following concerns: pt presents for increase in suicide ideation and plans to kill themselves.      Anticipated number of sessions or this episode of care: 1-4      Measurable Treatment Goal(s) related to diagnosis / functional impairment(s)    Goal: Reduce SI intensity and establish sense of hope for self and the future.  Objective: identify positives in their life.  Patient will: make a list of positives: relationships, achievements, support, etc.  LMHP will: assist patient in exploring/identifying positives.  Objective: Identify and replace negative thinking patterns.  Patient will: Discuss underlying assumptions and self-talk that may be contributing to hopelessness.  LMHP will: assist client in developing an awareness of and identifying cognitive messages that reinforce hopelessness.  Objective: Explore coping strategies.  Patient will: discuss things that have or may help to distract them or things that help them to feel better.  LMHP will: assist patient in developing coping strategies, ie: physical exercise, less internal focus, increased social activity, more expression of feeling, reaching out to others.    Goal: Patient will increase awareness of depression and anxiety symptoms and their impact on functioning and develop skills to reduce negative impact.  Objective #A  Patient will describe thoughts, feelings, and actions associated with depression.  Intervention(s)  LMHP will explore and process with patient how depression has impacted them.  Objective #B  Patient will  increase depression coping skills.  Intervention(s  LMHP will teach CBT skills and model their use.             Mental Status Exam   Affect: Appropriate   Appearance: Disheveled    Attention Span/Concentration: Attentive?    Eye Contact: Engaged   Fund of Knowledge: Appropriate    Language /Speech Content: Fluent   Language /Speech Volume: Normal    Language /Speech Rate/Productions: Normal    Recent Memory: Intact   Remote Memory: Intact   Mood: Anxious and Sad    Orientation to Person: Yes    Orientation to Place: Yes   Orientation to Time of Day: Yes    Orientation to Date: Yes    Situation (Do they understand why they are here?): Yes    Psychomotor Behavior: Normal    Thought Content: Suicidal   Thought Form: Intact       PLAN:   Patient will board in emPATH until patient and treatment deem it appropriate to either discharge or admit to a higher level of care. Details: Remain in emPATH overnight for observation and reassess next shift.      ELIZA KingsleySW
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
## 2022-06-13 NOTE — SAFE
"Magdalene Cohen  June 13, 2022  SAFE Note    Critical Safety Issues: Pt presents to ED for suicide ideation and plans to kill themselves. Pt reports they have plans to drown, walk into a lake, get into a car accident, or overdose on medications. Pt reports they have means, but no intent to kill themselves. Pt reports they feel under pressure from their job, parents and had a negative interaction with parents that, \"sent me over the edge today\". Pt reports they have forgotten their medications for the past 3 days, but are usually compliant with medication. Pt reports they engaged in self-harm one month ago and also feel anger building up in them, \"I just want someone to try me, but I don't want to hurt a specific person\". Pt reports they do not know if they feel safe to go home. Pt presents as alert, oriented, tearful, and engaged in assessment. Pt appears to be functioning below their baseline. Pt not able to engage in safety planning.     A lower level of care has been unsuccessful in treating and stabilizing patient s mental health symptoms. Patient will remain on [facility] unit under observation for continued monitoring, treatment and therapeutic intervention of mental health symptoms. [facility] help mitigate the need for a more restrictive level of care in an inpatient setting.       Current Suicidal Ideation/Self-Injurious Concerns/Methods: Cutting, Ingestion overdose on meds and Other Pt reports they have plans to drown, walk into a lake, get into a car accident, or overdose on medications.      Current or Historical Inappropriate Sexual Behavior: No      Current or Historical Aggression/Homicidal Ideation: Rumination      Triggers: pressure from parents    Guardianship Status: [facility] Guardian: is their own guardian.. Guardianship paperwork is not required.    This patient is a child/adolescent: No    This patient has additional special visitor precautions: No    Updated care team: Yes    For " additional details see full LMHP assessment.       RAFIA Zacarias

## 2022-06-13 NOTE — DISCHARGE INSTRUCTIONS
After Care Plan  If I am feeling unsafe or I am in a crisis, I will:   Contact my established care providers   Call the National Suicide Prevention Lifeline: 971.679.1594   Go to the nearest emergency room   Call 911     Warning signs that I or other people might notice when a crisis is developing for me: decreased appetite, less frequent cleaning/housekeeping, more irritable, fewer engagements in positive interactions, having a shorter fuse, increased heart rate indicated by AppleWatch  Things I am able to do on my own to cope or help me feel better: go for walks, be outside, laugh, cold/hot showers/TIPP, self care with hair and skin routine   Things that I am able to do with others to cope or help me better: socialize, laugh, eat out or cook with friends   Things I can use or do for distraction: mindfulness, DBT skills, watch a show, clean   Changes I can make to support my mental health and wellness: communicate better with needs and boundaries, balance those boundaries, focus on my chosen family   People in my life that I can ask for help: roommate, friends, boss   Your [facility] has a mental health crisis team you can call 24/7: [facility] Crisis Line Number: [phone] and [facility] Crisis Line Number: [phone]   Other things that are important when I m in crisis: listen to my needs/wants, validate feelings/experiences   Additional resources and appointment information: continue with established therapy and psychiatry providers; explore EMDR and Accelerated Resolution Therapy as options     Crisis Lines  Crisis Text Line  Text 911698  You will be connected with a trained live crisis counselor to provide support.  Gambling Hotline  1.800.333.hope [4673]  National Hope Line  1.800.SUICIDE [8472928]  National Suicide Prevention Lifeline  Free and confidential support  1.800.416.TALK [9885]  http://suicidepreventionlifeline.org  The [employer] Project (LGBTQ Youth Crisis Line)  6.045.984.1472   "text START to 115-884  [place]'s Crisis Line  2.510.575.1675 (Press 1)  or text 918448    Community Resources  Fast Tracker  Linking people to mental health and substance use disorder resources  [employer]   [place] Mental Health Warm Line  Peer to peer support  Monday thru Saturday, 12 pm to 10 pm  534.483.1048 or 0.077.782.2412  Text \"Support\" to 21306  National [place] on Mental Illness (GEO)  709.690.3354 or 1.888.GEO.HELPS  Walk-in Counseling Center  Free mental health counseling  [address] [facility] [place] [facility]  120.532.0838    Mental Health Apps  My3  https://Qianmi/  VirtualHopeBox  https://Seat 14A/apps/virtual-hope-box/  Suicide Safety Plan ([employer])  Calm Harm    Additional information:  Today you were seen by a licensed mental health professional through Triage and Transition services, Behavioral Healthcare Providers ([facility])  for a crisis assessment in the Emergency Department at [facility].  It is recommended that you follow up with your established providers (psychiatrist, mental health therapist, and/or primary care doctor - as relevant) as soon as possible. Coordinators from [facility] will be calling you in the next 24-48 hours to ensure that you have the resources you need.  You can also contact [facility] coordinators directly at [phone]. You may have been scheduled for or offered an appointment with a mental health provider. [facility] maintains an extensive network of licensed behavioral health providers to connect patients with the services they need.  We do not charge providers a fee to participate in our referral network.  We match patients with providers based on a patient's specific needs, insurance coverage, and location.  Our first effort will be to refer you to a provider within your care system, and will utilize providers outside your care system as needed.    "

## 2022-06-14 ENCOUNTER — PATIENT OUTREACH (OUTPATIENT)
Dept: CARE COORDINATION | Facility: CLINIC | Age: 27
End: 2022-06-14
Payer: COMMERCIAL

## 2022-06-14 DIAGNOSIS — Z71.89 OTHER SPECIFIED COUNSELING: ICD-10-CM

## 2022-06-14 NOTE — PROGRESS NOTES
Clinic Care Coordination Contact  [facility]/Voicemail       Clinical Data: Care Coordinator Outreach  Outreach attempted x 1. Unable to leave message on patient's voicemail with call back information.  Plan: Care Coordinator will try to reach patient again in 1-2 business days.    EVANGELISTA Perez  Connected Care Resource Center  [facility]     *Connected Care Resource Team does NOT follow patient ongoing. Referrals are identified based on internal discharge reports and the outreach is to ensure patient has an understanding of their discharge instructions.

## 2022-06-15 NOTE — PROGRESS NOTES
Clinic Care Coordination Contact  [facility]/Voicemail       Clinical Data: Care Coordinator Outreach  Outreach attempted x 2.  Unable to leave message on patient's voicemail with call back information.    Plan: Care Coordinator will do no further outreaches at this time.    EVANGELISTA Perez  Connected Care Resource Center  [facility]     *Connected Care Resource Team does NOT follow patient ongoing. Referrals are identified based on internal discharge reports and the outreach is to ensure patient has an understanding of their discharge instructions.

## 2022-09-11 ENCOUNTER — HEALTH MAINTENANCE LETTER (OUTPATIENT)
Age: 27
End: 2022-09-11

## 2023-06-03 ENCOUNTER — HEALTH MAINTENANCE LETTER (OUTPATIENT)
Age: 28
End: 2023-06-03

## 2024-03-05 ENCOUNTER — TRANSCRIBE ORDERS (OUTPATIENT)
Dept: OTHER | Age: 29
End: 2024-03-05

## 2024-03-05 DIAGNOSIS — G43.E09 CHRONIC MIGRAINE WITH AURA WITHOUT STATUS MIGRAINOSUS, NOT INTRACTABLE: Primary | ICD-10-CM

## 2024-04-18 ASSESSMENT — MIGRAINE DISABILITY ASSESSMENT (MIDAS)
ON A SCALE FROM 0-10 ON AVERAGE HOW PAINFUL WERE HEADACHES: 5
HOW OFTEN WERE SOCIAL ACTIVITIES MISSED DUE TO HEADACHES: 15
TOTAL SCORE: 120
HOW MANY DAYS DID YOU NOT DO HOUSEWORK BECAUSE OF HEADACHES: 30
HOW MANY DAYS DID YOU MISS WORK OR SCHOOL BECAUSE OF HEADACHES: 15
HOW MANY DAYS WAS YOUR PRODUCTIVITY CUT IN HALF BECAUSE OF HEADACHES: 30
HOW MANY DAYS IN THE PAST 3 MONTHS HAVE YOU HAD A HEADACHE: 40
HOW MANY DAYS WAS HOUSEWORK PRODUCTIVITY CUT IN HALF DUE TO HEADACHES: 30

## 2024-04-18 ASSESSMENT — HEADACHE IMPACT TEST (HIT 6)
HOW OFTEN HAVE YOU FELT TOO TIRED TO WORK BECAUSE OF YOUR HEADACHES: VERY OFTEN
HOW OFTEN DO HEADACHES LIMIT YOUR DAILY ACTIVITIES: VERY OFTEN
WHEN YOU HAVE HEADACHES HOW OFTEN IS THE PAIN SEVERE: VERY OFTEN
HOW OFTEN HAVE YOU FELT FED UP OR IRRITATED BECAUSE OF YOUR HEADACHES: ALWAYS
HIT6 TOTAL SCORE: 70
HOW OFTEN DID HEADACHS LIMIT CONCENTRATION ON WORK OR DAILY ACTIVITY: VERY OFTEN
WHEN YOU HAVE A HEADACHE HOW OFTEN DO YOU WISH YOU COULD LIE DOWN: ALWAYS

## 2024-04-25 ENCOUNTER — OFFICE VISIT (OUTPATIENT)
Dept: NEUROLOGY | Facility: CLINIC | Age: 29
End: 2024-04-25
Payer: COMMERCIAL

## 2024-04-25 VITALS — DIASTOLIC BLOOD PRESSURE: 83 MMHG | SYSTOLIC BLOOD PRESSURE: 120 MMHG | OXYGEN SATURATION: 99 % | HEART RATE: 81 BPM

## 2024-04-25 DIAGNOSIS — G43.E09 CHRONIC MIGRAINE WITH AURA WITHOUT STATUS MIGRAINOSUS, NOT INTRACTABLE: Primary | ICD-10-CM

## 2024-04-25 PROCEDURE — 99205 OFFICE O/P NEW HI 60 MIN: CPT

## 2024-04-25 RX ORDER — ALPRAZOLAM 1 MG
TABLET ORAL
COMMUNITY

## 2024-04-25 RX ORDER — TOPIRAMATE 25 MG/1
TABLET, FILM COATED ORAL
Qty: 42 TABLET | Refills: 0 | Status: SHIPPED | OUTPATIENT
Start: 2024-04-25 | End: 2024-04-25

## 2024-04-25 RX ORDER — MIRTAZAPINE 7.5 MG/1
TABLET, FILM COATED ORAL
COMMUNITY

## 2024-04-25 RX ORDER — LAMOTRIGINE 200 MG/1
TABLET ORAL
COMMUNITY
Start: 2024-02-21 | End: 2024-04-25

## 2024-04-25 RX ORDER — BUPROPION HYDROCHLORIDE 300 MG/1
300 TABLET ORAL EVERY MORNING
COMMUNITY
Start: 2024-04-08

## 2024-04-25 RX ORDER — DILTIAZEM HYDROCHLORIDE 60 MG/1
2 TABLET, FILM COATED ORAL 2 TIMES DAILY
COMMUNITY
Start: 2024-04-23

## 2024-04-25 RX ORDER — RIZATRIPTAN BENZOATE 10 MG/1
10 TABLET ORAL
Qty: 30 TABLET | Refills: 3 | Status: SHIPPED | OUTPATIENT
Start: 2024-04-25

## 2024-04-25 RX ORDER — ONDANSETRON 4 MG/1
4 TABLET, FILM COATED ORAL
COMMUNITY
Start: 2024-04-22

## 2024-04-25 RX ORDER — TOPIRAMATE 100 MG/1
100 TABLET, FILM COATED ORAL AT BEDTIME
Qty: 30 TABLET | Refills: 1 | Status: SHIPPED | OUTPATIENT
Start: 2024-05-16 | End: 2024-04-25

## 2024-04-25 RX ORDER — TIZANIDINE 2 MG/1
2-4 TABLET ORAL
COMMUNITY
Start: 2024-03-04

## 2024-04-25 RX ORDER — FLUOXETINE 10 MG/1
CAPSULE ORAL
COMMUNITY
End: 2024-09-20

## 2024-04-25 RX ORDER — RIZATRIPTAN BENZOATE 10 MG/1
10 TABLET ORAL
COMMUNITY
Start: 2024-04-03 | End: 2024-04-25

## 2024-04-25 ASSESSMENT — PAIN SCALES - GENERAL: PAINLEVEL: MODERATE PAIN (5)
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
## 2024-04-25 NOTE — NURSING NOTE
"Magdalene Cohen is a 28 year old female who presents for:  Chief Complaint   Patient presents with    Headache     Chronic migraine with aura without status migrainosus, not intractable, allan La NP, recs in epic, scheduled per pt        Initial Vitals:  /83   Pulse 81   SpO2 99%  Estimated body mass index is 30.41 kg/m  as calculated from the following:    Height as of 6/12/22: 1.727 m (5' 8\").    Weight as of 6/12/22: 90.7 kg (200 lb).. There is no height or weight on file to calculate BSA. BP completed using cuff size: regular  Moderate Pain (5)    Nursing Comments:     Lia Serrato MA   "
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
## 2024-04-25 NOTE — PATIENT INSTRUCTIONS
When you have a headache:  - Acetaminophen or Naproxen up to 14 days per month (gradually reduce use of these medications)  - Rizatriptan 10 mg at onset of bad headache, may repeat dose after 2 hours if needed. Can use 9 days per month.  **To get Rizatriptan through [facility] - visit [email or web address] and make an account. I will send the prescription to them and it will be shipped to your home.**    For headache prevention:  - We will try to get Botox approved for you.       Call or MyChart if any questions or concerns.

## 2024-04-26 DIAGNOSIS — G43.E09 CHRONIC MIGRAINE WITH AURA WITHOUT STATUS MIGRAINOSUS, NOT INTRACTABLE: Primary | ICD-10-CM

## 2024-04-26 RX ORDER — KETOROLAC TROMETHAMINE 10 MG/1
10 TABLET, FILM COATED ORAL EVERY 6 HOURS PRN
Qty: 15 TABLET | Refills: 3 | Status: SHIPPED | OUTPATIENT
Start: 2024-04-26

## 2024-04-26 RX ORDER — METOCLOPRAMIDE 10 MG/1
10 TABLET ORAL 3 TIMES DAILY PRN
Qty: 20 TABLET | Refills: 3 | Status: SHIPPED | OUTPATIENT
Start: 2024-04-26

## 2024-05-10 ENCOUNTER — OFFICE VISIT (OUTPATIENT)
Dept: NEUROLOGY | Facility: CLINIC | Age: 29
End: 2024-05-10
Payer: COMMERCIAL

## 2024-05-10 DIAGNOSIS — G43.E09 CHRONIC MIGRAINE WITH AURA WITHOUT STATUS MIGRAINOSUS, NOT INTRACTABLE: Primary | ICD-10-CM

## 2024-05-10 PROCEDURE — 64615 CHEMODENERV MUSC MIGRAINE: CPT

## 2024-05-10 NOTE — PROGRESS NOTES
[facility]  Botulinum Toxin Procedure    Angela Servin PA-C  Headache Neurology    May 10, 2024    Procedure: OnabotulinumtoxinA injections for chronic migraine  Indication: Chronic migraine    Magdalene Cohen suffers from severe intractable headaches. She was referred by Angela Servin PA-C for onabotulinumtoxinA injections for headache.      Her headaches meet criteria for chronic migraine. She describes her headaches as starting around and behind the left eye, then can spread to also affect the right side. Headaches are a throbbing pressure. Typically her headaches are a 5-6/10, but they can reach 9/10 when very severe. Headaches last between 4-14 days in duration. She has associated nausea with rare vomiting, photophobia, phonophobia, visual aura.    Prior to initiation of botulinum toxin injections, Magdalene reported 15/30 headache days per month, with 8/30 severe headache days per month. Her headaches are quite disabling and often interfere with her ability to function normally.    This is her first round of injections today.    She has attempted other migraine prophylactic treatments in the past, which have included: bupropion, fluoxetine, mirtazapine, venlafaxine, lamotrigine. Beta blockers contraindicated due to asthma and topiramate interacts with lamotrigine.    She currently takes fluoxetine, mirtazapine, lamotrigine, bupropion without headache benefit.    Patient's pain was assessed prior to the procedure. She rated her pain today as 3 out of 10.      The procedure was explained to the patient. Benefits of the treatment were discussed including headache and migraine reduction. Risks of the procedure were reviewed including but not limited to pain, bruising, bleeding, infection, and weakness of muscles injected or those distal to injection sites. Alternatives were discussed. The patient voiced understanding of the risks and benefits. All questions answered and patient consented to  proceed.    Procedural Pause: Procedural pause was conducted to verify correct patient identity, procedure to be performed, correct side and site, correct patient position, and special requirements. Appropriate hand hygiene was utilized, and each injection site was prepped with alcohol wipes or Chloraprep swab.     Procedure Details:   200 units of onabotulinumtoxinA was diluted in 4 mL 0.9% normal saline.   A total of 150 units of onabotulinumtoxinA were injected using 30 gauge 0.5 inch needles into the muscles listed below. 50 units of onabotulinumtoxinA were wasted.     Injection Sites: Total = 150 units onabotulinumtoxinA     Procerus muscles - 5 units into the procerus muscle (5 units total)     muscles - 5 units into the left  muscle and 5 units into the right  muscle (1 injection site per muscle) (10 units total)    Frontalis muscles - 5 units into the left superior frontalis muscle and 5 units into the right superior frontalis muscle (2 injection sites per muscle) (10 units total)    Temporalis muscles - 12.5 units into the left temporalis muscle and 12.5 units into the right temporalis muscle (2 injection sites per muscle) (25 units total)    Occipitalis muscles - 12.5 units into the left occipitalis muscle and 12.5 units into the right occipitalis muscle (2 injection sites per muscle) (25 units total)    Splenius Capitis muscles - 12.5 units into the left splenius capitis muscle and 12.5 units into the right splenius capitis muscle (2 injection sites per muscle, divided into 2/3 anteriorly and 1/3 posteriorly) (25 units total)      Trapezius muscles - 25 units into the left trapezius muscle and 25 units into the right trapezius muscle (3 injection sites per muscle, divided into 5 units, 10 units, 10 units, medial to lateral) (50 units total)      Patient tolerated the procedure well without immediate complications. She will follow up in clinic for assessment of the  effectiveness of treatment. She did not report any change in her pain level after the botulinumtoxinA injection procedure.      Angela Servin PA-C  Headache Neurology  [facility]

## 2024-05-10 NOTE — LETTER
5/10/2024         RE: Magdalene Cohen  [address] [place] [address]        Dear Colleague,    Thank you for referring your patient, Magdalene Cohen, to the [facility]UROLOG[facility]. Please see a copy of my visit note below.    [facility]  Botulinum Toxin Procedure    Angela Servin PA-C  Headache Neurology    May 10, 2024    Procedure: OnabotulinumtoxinA injections for chronic migraine  Indication: Chronic migraine    Magdalene Cohen suffers from severe intractable headaches. She was referred by Angela Servin PA-C for onabotulinumtoxinA injections for headache.      Her headaches meet criteria for chronic migraine. She describes her headaches as starting around and behind the left eye, then can spread to also affect the right side. Headaches are a throbbing pressure. Typically her headaches are a 5-6/10, but they can reach 9/10 when very severe. Headaches last between 4-14 days in duration. She has associated nausea with rare vomiting, photophobia, phonophobia, visual aura.    Prior to initiation of botulinum toxin injections, Magdalene reported 15/30 headache days per month, with 8/30 severe headache days per month. Her headaches are quite disabling and often interfere with her ability to function normally.    This is her first round of injections today.    She has attempted other migraine prophylactic treatments in the past, which have included: bupropion, fluoxetine, mirtazapine, venlafaxine, lamotrigine. Beta blockers contraindicated due to asthma and topiramate interacts with lamotrigine.    She currently takes fluoxetine, mirtazapine, lamotrigine, bupropion without headache benefit.    Patient's pain was assessed prior to the procedure. She rated her pain today as 3 out of 10.      The procedure was explained to the patient. Benefits of the treatment were discussed including headache and migraine reduction. Risks of the procedure were reviewed including but not  limited to pain, bruising, bleeding, infection, and weakness of muscles injected or those distal to injection sites. Alternatives were discussed. The patient voiced understanding of the risks and benefits. All questions answered and patient consented to proceed.    Procedural Pause: Procedural pause was conducted to verify correct patient identity, procedure to be performed, correct side and site, correct patient position, and special requirements. Appropriate hand hygiene was utilized, and each injection site was prepped with alcohol wipes or Chloraprep swab.     Procedure Details:   200 units of onabotulinumtoxinA was diluted in 4 mL 0.9% normal saline.   A total of 150 units of onabotulinumtoxinA were injected using 30 gauge 0.5 inch needles into the muscles listed below. 50 units of onabotulinumtoxinA were wasted.     Injection Sites: Total = 150 units onabotulinumtoxinA     Procerus muscles - 5 units into the procerus muscle (5 units total)     muscles - 5 units into the left  muscle and 5 units into the right  muscle (1 injection site per muscle) (10 units total)    Frontalis muscles - 5 units into the left superior frontalis muscle and 5 units into the right superior frontalis muscle (2 injection sites per muscle) (10 units total)    Temporalis muscles - 12.5 units into the left temporalis muscle and 12.5 units into the right temporalis muscle (2 injection sites per muscle) (25 units total)    Occipitalis muscles - 12.5 units into the left occipitalis muscle and 12.5 units into the right occipitalis muscle (2 injection sites per muscle) (25 units total)    Splenius Capitis muscles - 12.5 units into the left splenius capitis muscle and 12.5 units into the right splenius capitis muscle (2 injection sites per muscle, divided into 2/3 anteriorly and 1/3 posteriorly) (25 units total)      Trapezius muscles - 25 units into the left trapezius muscle and 25 units into the right trapezius  muscle (3 injection sites per muscle, divided into 5 units, 10 units, 10 units, medial to lateral) (50 units total)      Patient tolerated the procedure well without immediate complications. She will follow up in clinic for assessment of the effectiveness of treatment. She did not report any change in her pain level after the botulinumtoxinA injection procedure.      Sonu Servin PA-C  Headache Neurology  [facility] Neurology [facility]      Again, thank you for allowing me to participate in the care of your patient.        Sincerely,        SONU SERVIN PA-C

## 2024-06-26 ENCOUNTER — TELEPHONE (OUTPATIENT)
Dept: NEUROLOGY | Facility: CLINIC | Age: 29
End: 2024-06-26
Payer: COMMERCIAL

## 2024-06-26 NOTE — TELEPHONE ENCOUNTER
Left message - offering patient to move July 3 appt to July 1 at 8:30a. This would then be a virtual visit- either video or telephone with Dr. Servin.

## 2024-08-02 ENCOUNTER — OFFICE VISIT (OUTPATIENT)
Dept: NEUROLOGY | Facility: CLINIC | Age: 29
End: 2024-08-02
Payer: COMMERCIAL

## 2024-08-02 DIAGNOSIS — G43.E09 CHRONIC MIGRAINE WITH AURA WITHOUT STATUS MIGRAINOSUS, NOT INTRACTABLE: Primary | ICD-10-CM

## 2024-08-02 PROCEDURE — 64615 CHEMODENERV MUSC MIGRAINE: CPT

## 2024-08-02 NOTE — PROGRESS NOTES
[facility]  Botulinum Toxin Procedure    Angela Servin PA-C  Headache Neurology    August 2, 2024    Procedure: OnabotulinumtoxinA injections for chronic migraine  Indication: Chronic migraine    Magdalene Cohen suffers from severe intractable headaches. She was referred by Angela Servin PA-C for onabotulinumtoxinA injections for headache.       Her headaches meet criteria for chronic migraine. She describes her headaches as starting around and behind the left eye, then can spread to also affect the right side. Headaches are a throbbing pressure. Typically her headaches are a 5-6/10, but they can reach 9/10 when very severe. Headaches last between 4-14 days in duration. She has associated nausea with rare vomiting, photophobia, phonophobia, visual aura.     Prior to initiation of botulinum toxin injections, Magdalene reported 15/30 headache days per month, with 8/30 severe headache days per month. Her headaches are quite disabling and often interfere with her ability to function normally.    Their last round of botulinum toxin injections was on 5/10/2024.    Magdalene reports 4-8 headache days per month currently, with 2-3 severe headache days per month. She has noticed a wearing off phenomenon prior to this round of botulinum toxin injections, lasting 2-3 weeks.     Magdalene reports the following benefits of botulinum toxin injections from their last round: Acute medications were more effective.    She has attempted other migraine prophylactic treatments in the past, which have included: bupropion, fluoxetine, mirtazapine, venlafaxine, lamotrigine. Beta blockers contraindicated due to asthma and topiramate interacts with lamotrigine.     She currently takes fluoxetine, mirtazapine, lamotrigine, bupropion without headache benefit.    Patient's pain was assessed prior to the procedure. She rated her pain today as 2 out of 10.      The procedure was explained to the patient. Benefits of the treatment were  discussed including headache and migraine reduction. Risks of the procedure were reviewed including but not limited to pain, bruising, bleeding, infection, and weakness of muscles injected or those distal to injection sites. Alternatives were discussed. The patient voiced understanding of the risks and benefits. All questions answered and patient consented to proceed.    Procedural Pause: Procedural pause was conducted to verify correct patient identity, procedure to be performed, correct side and site, correct patient position, and special requirements. Appropriate hand hygiene was utilized, and each injection site was prepped with alcohol wipes or Chloraprep swab.     Procedure Details:   200 units of onabotulinumtoxinA was diluted in 4 mL 0.9% normal saline.   A total of 150 units of onabotulinumtoxinA were injected using 30 gauge 0.5 inch needles into the muscles listed below. 50 units of onabotulinumtoxinA were wasted.     Injection Sites: Total = 150 units onabotulinumtoxinA     Procerus muscles - 5 units into the procerus muscle (5 units total)     muscles - 5 units into the left  muscle and 5 units into the right  muscle (1 injection site per muscle) (10 units total)    Frontalis muscles - 5 units into the left superior frontalis muscle and 5 units into the right superior frontalis muscle (2 injection sites per muscle) (10 units total)    Temporalis muscles - 12.5 units into the left temporalis muscle and 12.5 units into the right temporalis muscle (2 injection sites per muscle) (25 units total)    Occipitalis muscles - 12.5 units into the left occipitalis muscle and 12.5 units into the right occipitalis muscle (2 injection sites per muscle) (25 units total)    Splenius Capitis muscles - 12.5 units into the left splenius capitis muscle and 12.5 units into the right splenius capitis muscle (2 injection sites per muscle, divided into 2/3 anteriorly and 1/3 posteriorly) (25 units  total)      ** Trapezius muscles - 20 units into the left trapezius muscle and 20 units into the right trapezius muscle (3 injection sites per muscle, divided into 5 units, 10 units, 5 units, medial to lateral) (40 units total)     ** Masseter muscles - 5 units into the left masseter muscle and 5 units into the right masseter muscle (1 injection site per muscle) (10 units total)        Patient tolerated the procedure well without immediate complications. She will follow up in clinic for assessment of the effectiveness of treatment. She did not report any change in her pain level after the botulinumtoxinA injection procedure.      Angela Servin PA-C  Headache Neurology  [facility] Neurology [facility]

## 2024-08-02 NOTE — LETTER
8/2/2024      Magdalene Cohen  [address] [place] [address]      Dear Colleague,    Thank you for referring your patient, Magdalene Cohen, to the [facility] NEUROLOGY CLINICS [facility]. Please see a copy of my visit note below.    [facility]  Botulinum Toxin Procedure    Angela Servin PA-C  Headache Neurology    August 2, 2024    Procedure: OnabotulinumtoxinA injections for chronic migraine  Indication: Chronic migraine    Magdalene Cohen suffers from severe intractable headaches. She was referred by nAgela Servin PA-C for onabotulinumtoxinA injections for headache.       Her headaches meet criteria for chronic migraine. She describes her headaches as starting around and behind the left eye, then can spread to also affect the right side. Headaches are a throbbing pressure. Typically her headaches are a 5-6/10, but they can reach 9/10 when very severe. Headaches last between 4-14 days in duration. She has associated nausea with rare vomiting, photophobia, phonophobia, visual aura.     Prior to initiation of botulinum toxin injections, Magdalene reported 15/30 headache days per month, with 8/30 severe headache days per month. Her headaches are quite disabling and often interfere with her ability to function normally.    Their last round of botulinum toxin injections was on 5/10/2024.    Magdalene reports 4-8 headache days per month currently, with 2-3 severe headache days per month. She has noticed a wearing off phenomenon prior to this round of botulinum toxin injections, lasting 2-3 weeks.     Magdalene reports the following benefits of botulinum toxin injections from their last round: Acute medications were more effective.    She has attempted other migraine prophylactic treatments in the past, which have included: bupropion, fluoxetine, mirtazapine, venlafaxine, lamotrigine. Beta blockers contraindicated due to asthma and topiramate interacts with lamotrigine.     She currently takes  fluoxetine, mirtazapine, lamotrigine, bupropion without headache benefit.    Patient's pain was assessed prior to the procedure. She rated her pain today as 2 out of 10.      The procedure was explained to the patient. Benefits of the treatment were discussed including headache and migraine reduction. Risks of the procedure were reviewed including but not limited to pain, bruising, bleeding, infection, and weakness of muscles injected or those distal to injection sites. Alternatives were discussed. The patient voiced understanding of the risks and benefits. All questions answered and patient consented to proceed.    Procedural Pause: Procedural pause was conducted to verify correct patient identity, procedure to be performed, correct side and site, correct patient position, and special requirements. Appropriate hand hygiene was utilized, and each injection site was prepped with alcohol wipes or Chloraprep swab.     Procedure Details:   200 units of onabotulinumtoxinA was diluted in 4 mL 0.9% normal saline.   A total of 150 units of onabotulinumtoxinA were injected using 30 gauge 0.5 inch needles into the muscles listed below. 50 units of onabotulinumtoxinA were wasted.     Injection Sites: Total = 150 units onabotulinumtoxinA     Procerus muscles - 5 units into the procerus muscle (5 units total)     muscles - 5 units into the left  muscle and 5 units into the right  muscle (1 injection site per muscle) (10 units total)    Frontalis muscles - 5 units into the left superior frontalis muscle and 5 units into the right superior frontalis muscle (2 injection sites per muscle) (10 units total)    Temporalis muscles - 12.5 units into the left temporalis muscle and 12.5 units into the right temporalis muscle (2 injection sites per muscle) (25 units total)    Occipitalis muscles - 12.5 units into the left occipitalis muscle and 12.5 units into the right occipitalis muscle (2 injection sites per  muscle) (25 units total)    Splenius Capitis muscles - 12.5 units into the left splenius capitis muscle and 12.5 units into the right splenius capitis muscle (2 injection sites per muscle, divided into 2/3 anteriorly and 1/3 posteriorly) (25 units total)      ** Trapezius muscles - 20 units into the left trapezius muscle and 20 units into the right trapezius muscle (3 injection sites per muscle, divided into 5 units, 10 units, 5 units, medial to lateral) (40 units total)     ** Masseter muscles - 5 units into the left masseter muscle and 5 units into the right masseter muscle (1 injection site per muscle) (10 units total)        Patient tolerated the procedure well without immediate complications. She will follow up in clinic for assessment of the effectiveness of treatment. She did not report any change in her pain level after the botulinumtoxinA injection procedure.      Sonu Servin PA-C  Headache Neurology  [facility] Neurology [facility]      Again, thank you for allowing me to participate in the care of your patient.        Sincerely,        SONU SERVIN PA-C

## 2024-09-20 ENCOUNTER — OFFICE VISIT (OUTPATIENT)
Dept: NEUROLOGY | Facility: CLINIC | Age: 29
End: 2024-09-20
Payer: COMMERCIAL

## 2024-09-20 VITALS — SYSTOLIC BLOOD PRESSURE: 106 MMHG | HEART RATE: 88 BPM | DIASTOLIC BLOOD PRESSURE: 73 MMHG | OXYGEN SATURATION: 98 %

## 2024-09-20 DIAGNOSIS — G43.E09 CHRONIC MIGRAINE WITH AURA WITHOUT STATUS MIGRAINOSUS, NOT INTRACTABLE: Primary | ICD-10-CM

## 2024-09-20 PROCEDURE — G2211 COMPLEX E/M VISIT ADD ON: HCPCS

## 2024-09-20 PROCEDURE — 99213 OFFICE O/P EST LOW 20 MIN: CPT

## 2024-09-20 RX ORDER — LAMOTRIGINE 200 MG/1
2 TABLET ORAL
COMMUNITY
Start: 2024-09-05

## 2024-09-20 RX ORDER — BUPROPION HYDROCHLORIDE 100 MG/1
100 TABLET, EXTENDED RELEASE ORAL DAILY
COMMUNITY
Start: 2024-06-06

## 2024-09-20 RX ORDER — DULOXETIN HYDROCHLORIDE 30 MG/1
30 CAPSULE, DELAYED RELEASE ORAL DAILY
COMMUNITY
Start: 2024-09-05

## 2024-09-20 RX ORDER — TRIFLUOPERAZINE HYDROCHLORIDE 2 MG/1
1 TABLET, FILM COATED ORAL 2 TIMES DAILY
COMMUNITY

## 2024-09-20 ASSESSMENT — MIGRAINE DISABILITY ASSESSMENT (MIDAS)
HOW MANY DAYS IN THE PAST 3 MONTHS HAVE YOU HAD A HEADACHE: 20
HOW MANY DAYS WAS YOUR PRODUCTIVITY CUT IN HALF BECAUSE OF HEADACHES: 15
TOTAL SCORE: 46
HOW MANY DAYS DID YOU NOT DO HOUSEWORK BECAUSE OF HEADACHES: 5
HOW OFTEN WERE SOCIAL ACTIVITIES MISSED DUE TO HEADACHES: 1
HOW MANY DAYS DID YOU MISS WORK OR SCHOOL BECAUSE OF HEADACHES: 10
HOW MANY DAYS WAS HOUSEWORK PRODUCTIVITY CUT IN HALF DUE TO HEADACHES: 15
ON A SCALE FROM 0-10 ON AVERAGE HOW PAINFUL WERE HEADACHES: 4

## 2024-09-20 ASSESSMENT — HEADACHE IMPACT TEST (HIT 6)
HIT6 TOTAL SCORE: 51
HOW OFTEN DID HEADACHS LIMIT CONCENTRATION ON WORK OR DAILY ACTIVITY: RARELY
WHEN YOU HAVE A HEADACHE HOW OFTEN DO YOU WISH YOU COULD LIE DOWN: ALWAYS
HOW OFTEN DO HEADACHES LIMIT YOUR DAILY ACTIVITIES: RARELY
WHEN YOU HAVE HEADACHES HOW OFTEN IS THE PAIN SEVERE: RARELY
HOW OFTEN HAVE YOU FELT FED UP OR IRRITATED BECAUSE OF YOUR HEADACHES: NEVER
HOW OFTEN HAVE YOU FELT TOO TIRED TO WORK BECAUSE OF YOUR HEADACHES: RARELY

## 2024-09-20 ASSESSMENT — PAIN SCALES - GENERAL: PAINLEVEL: NO PAIN (0)

## 2024-09-20 NOTE — NURSING NOTE
"Magdalene Cohen is a 29 year old female who presents for:  Chief Complaint   Patient presents with    Headache     Follow Up headache, records in [facility], scheduled per pt        Initial Vitals:  /73   Pulse 88   SpO2 98%  Estimated body mass index is 30.41 kg/m  as calculated from the following:    Height as of 6/12/22: 1.727 m (5' 8\").    Weight as of 6/12/22: 90.7 kg (200 lb).. There is no height or weight on file to calculate BSA. BP completed using cuff size: large  No Pain (0)    Nursing Comments:     Lia Serrato MA   "

## 2024-09-20 NOTE — LETTER
9/20/2024      Magdalene Cohen  [address] [place] [address]      Dear Colleague,    Thank you for referring your patient, Magdalene Cohen, to the [facility] NEUROLOGY CLINICS [facility]. Please see a copy of my visit note below.    [facility]    Headache Neurology Progress Note    September 20, 2024    Assessment and Plan:   Magdalene is a 29 year old female who presents for follow up of chronic migraine with aura.     We reviewed the following headache treatment strategy:  - For acute treatment of mild headache, she may use acetaminophen or naproxen as needed, not to exceed 14 days per month to avoid medication overuse. She does not currently suffer from medication overuse.  - For acute treatment of moderate to severe headache, she may continue to use rizatriptan 10 mg tablet taken at onset of headache with repeat dose taken after 2 hours if needed. Use should not exceed 9 days per month to avoid medication overuse.  Previously she did not have benefit from sumatriptan.  - For nausea with headache, she may use ondansetron as needed.   - She has metoclopramide and ketorolac available for intractable migraines as needed.      Her current frequency and severity of headaches warrant prevention.  - Recommend she continue with botulinum toxin injections following a chronic migraine protocol to be administered in the clinic every 12 weeks.  This has been effective for reducing headache frequency and severity by greater than 50%.    The longitudinal plan of care for the diagnosis(es)/condition(s) as documented were addressed during this visit. Due to the added complexity in care, I will continue to support Magdalene in the subsequent management and with ongoing continuity of care.     Follow up in 1 year or sooner if needed.    Angela Servin PA-C  Headache Neurology  [facility] Neurology [facility]      Subjective:    Magdalene presents for follow up of chronic migraine with  aura.     Headache description: She describes her headaches as starting around and behind the left eye, then can spread to also affect the right side. Headaches are a throbbing pressure. Typically her headaches are a 5-6/10, but they can reach 9/10 when very severe. Headaches last between 4-14 days in duration. She has associated nausea with rare vomiting, photophobia, phonophobia, visual aura.     At baseline, Magadlene reported 15/30 headache days per month, with 8/30 severe headache days per month.    Last office visit was 4/25/2024 at which time it was recommended she try Botox for migraine prevention and she has completed 2 rounds thus far (last Botox was 8/2/2024).     Magdalene currently reports 4-8/30 headache days per month, with 1/30 severe headache days per month.     Did not notice much change with addition of masseter injections, will continue with standard Botox protocol. She does notice some asymmetric forehead movement from frontalis injections.     Rizatriptan is more effective now with Botox on board.   Before starting Botox, she did have one bad migraine requiring at home migraine cocktail of metoclopramide, diphenhydramine, ketorolac and this was effective.     Current headache treatments:  Acute therapies:  - Acetaminophen  - Naproxen  - Rizatriptan 10 mg  - Ondansetron      Preventative therapies:  - Botox 150 units  - Bupropion (mood)  - Lamotrigine (mood)  - Duloxetine (mood)  - Desvenlafaxine (mood)     Supportive therapies:     Previous treatments tried:  Acute therapies:  - Sumatriptan 50 mg - not effective     Preventative therapies:  - Sertraline (mood)     Supportive therapies:        4/18/2024     7:49 PM 9/20/2024     3:21 AM   HIT-6   When you have headaches, how often is the pain severe 11 8   How often do headaches limit your ability to do usual daily activities including household work, work, school, or social activities? 11 8   When you have a headache, how often do you wish you  could lie down? 13 13   In the past 4 weeks, how often have you felt too tired to do work or daily activities because of your headaches 11 8   In the past 4 weeks, how often have you felt fed up or irritated because of your headaches 13 6   In the past 4 weeks, how often did headaches limit your ability to concentrate on work or daily activities 11 8   HIT-6 Total Score 70 51           4/18/2024     8:11 PM 9/20/2024     3:30 AM   MIDAS - in the past three months:   On how many days did you miss work or school because of your headaches? 15 10   How many days was your productivity at work or school reduced by half or more because of your headaches? 30 15   On how many days did you not do household work because of your headaches? 30 5   How many days was your productivity in household work reduced by half or more because of your headaches? 30 15   On how many days did you miss family, social, or leisure activities because of your headaches? 15 1   On how many days did you have a headache? 40 20   On a scale of 0-10, on average how painful were these headaches? 5 4   MIDAS Score 120 (IV - Severe Disability) 46 (IV - Severe Disability)        Objective:    Vitals: /73   Pulse 88   SpO2 98%   General: Cooperative, NAD  Neurologic Exam:  Mental Status: Fully alert, attentive and oriented. Speech is clear and fluent.   Cranial Nerves: Facial movements symmetric.   Motor: No abnormal movements.            Again, thank you for allowing me to participate in the care of your patient.        Sincerely,        SONU JOE PA-C

## 2024-09-20 NOTE — PROGRESS NOTES
[facility]    Headache Neurology Progress Note    September 20, 2024    Assessment and Plan:   Magdalene is a 29 year old female who presents for follow up of chronic migraine with aura.     We reviewed the following headache treatment strategy:  - For acute treatment of mild headache, she may use acetaminophen or naproxen as needed, not to exceed 14 days per month to avoid medication overuse. She does not currently suffer from medication overuse.  - For acute treatment of moderate to severe headache, she may continue to use rizatriptan 10 mg tablet taken at onset of headache with repeat dose taken after 2 hours if needed. Use should not exceed 9 days per month to avoid medication overuse.  Previously she did not have benefit from sumatriptan.  - For nausea with headache, she may use ondansetron as needed.   - She has metoclopramide and ketorolac available for intractable migraines as needed.      Her current frequency and severity of headaches warrant prevention.  - Recommend she continue with botulinum toxin injections following a chronic migraine protocol to be administered in the clinic every 12 weeks.  This has been effective for reducing headache frequency and severity by greater than 50%.    The longitudinal plan of care for the diagnosis(es)/condition(s) as documented were addressed during this visit. Due to the added complexity in care, I will continue to support Magdalene in the subsequent management and with ongoing continuity of care.     Follow up in 1 year or sooner if needed.    Angela Servin PA-C  Headache Neurology  [facility] Neurology - [place]      Subjective:    Magdalene presents for follow up of chronic migraine with aura.     Headache description: She describes her headaches as starting around and behind the left eye, then can spread to also affect the right side. Headaches are a throbbing pressure. Typically her headaches are a 5-6/10, but they can reach 9/10 when  very severe. Headaches last between 4-14 days in duration. She has associated nausea with rare vomiting, photophobia, phonophobia, visual aura.     At baseline, Magdalene reported 15/30 headache days per month, with 8/30 severe headache days per month.    Last office visit was 4/25/2024 at which time it was recommended she try Botox for migraine prevention and she has completed 2 rounds thus far (last Botox was 8/2/2024).     Magdalene currently reports 4-8/30 headache days per month, with 1/30 severe headache days per month.     Did not notice much change with addition of masseter injections, will continue with standard Botox protocol. She does notice some asymmetric forehead movement from frontalis injections.     Rizatriptan is more effective now with Botox on board.   Before starting Botox, she did have one bad migraine requiring at home migraine cocktail of metoclopramide, diphenhydramine, ketorolac and this was effective.     Current headache treatments:  Acute therapies:  - Acetaminophen  - Naproxen  - Rizatriptan 10 mg  - Ondansetron      Preventative therapies:  - Botox 150 units  - Bupropion (mood)  - Lamotrigine (mood)  - Duloxetine (mood)  - Desvenlafaxine (mood)     Supportive therapies:     Previous treatments tried:  Acute therapies:  - Sumatriptan 50 mg - not effective     Preventative therapies:  - Sertraline (mood)     Supportive therapies:        4/18/2024     7:49 PM 9/20/2024     3:21 AM   HIT-6   When you have headaches, how often is the pain severe 11 8   How often do headaches limit your ability to do usual daily activities including household work, work, school, or social activities? 11 8   When you have a headache, how often do you wish you could lie down? 13 13   In the past 4 weeks, how often have you felt too tired to do work or daily activities because of your headaches 11 8   In the past 4 weeks, how often have you felt fed up or irritated because of your headaches 13 6   In the past 4  weeks, how often did headaches limit your ability to concentrate on work or daily activities 11 8   HIT-6 Total Score 70 51           4/18/2024     8:11 PM 9/20/2024     3:30 AM   MIDAS - in the past three months:   On how many days did you miss work or school because of your headaches? 15 10   How many days was your productivity at work or school reduced by half or more because of your headaches? 30 15   On how many days did you not do household work because of your headaches? 30 5   How many days was your productivity in household work reduced by half or more because of your headaches? 30 15   On how many days did you miss family, social, or leisure activities because of your headaches? 15 1   On how many days did you have a headache? 40 20   On a scale of 0-10, on average how painful were these headaches? 5 4   MIDAS Score 120 (IV - Severe Disability) 46 (IV - Severe Disability)        Objective:    Vitals: /73   Pulse 88   SpO2 98%   General: Cooperative, NAD  Neurologic Exam:  Mental Status: Fully alert, attentive and oriented. Speech is clear and fluent.   Cranial Nerves: Facial movements symmetric.   Motor: No abnormal movements.

## 2024-10-25 ENCOUNTER — OFFICE VISIT (OUTPATIENT)
Dept: NEUROLOGY | Facility: CLINIC | Age: 29
End: 2024-10-25
Payer: COMMERCIAL

## 2024-10-25 DIAGNOSIS — G43.E09 CHRONIC MIGRAINE WITH AURA WITHOUT STATUS MIGRAINOSUS, NOT INTRACTABLE: Primary | ICD-10-CM

## 2024-10-25 PROCEDURE — 64615 CHEMODENERV MUSC MIGRAINE: CPT

## 2024-10-25 NOTE — LETTER
10/25/2024      Magdalene Cohen  [address] [place] [address]      Dear Colleague,    Thank you for referring your patient, Magdalene Cohen, to the [facility]UROLOG[facility]. Please see a copy of my visit note below.    [facility]  Botulinum Toxin Procedure    Angela Servin PA-C  Headache Neurology    October 25, 2024    Procedure: OnabotulinumtoxinA injections for chronic migraine  Indication: Chronic migraine    Magdalene Cohen suffers from severe intractable headaches. She was referred by Angela Servin PA-C for onabotulinumtoxinA injections for headache.       Her headaches meet criteria for chronic migraine. She describes her headaches as starting around and behind the left eye, then can spread to also affect the right side. Headaches are a throbbing pressure. Typically her headaches are a 5-6/10, but they can reach 9/10 when very severe. Headaches last between 4-14 days in duration. She has associated nausea with rare vomiting, photophobia, phonophobia, visual aura.     Prior to initiation of botulinum toxin injections, Magdalene reported 15/30 headache days per month, with 8/30 severe headache days per month. Her headaches are quite disabling and often interfere with her ability to function normally.    Their last round of botulinum toxin injections was on 8/2/2024.    Magdalene reports <4 headache days per month currently, with 1-2 severe headache days per month. She has noticed a wearing off phenomenon prior to this round of botulinum toxin injections, lasting 2-3 weeks.     Magdalene reports the following benefits of botulinum toxin injections from their last round: Acute medications were more effective.     She has attempted other migraine prophylactic treatments in the past, which have included: bupropion, fluoxetine, mirtazapine, venlafaxine, lamotrigine. Beta blockers contraindicated due to asthma and topiramate interacts with lamotrigine.     She currently takes  duloxetine, mirtazapine, lamotrigine, bupropion without headache benefit.      The procedure was explained to the patient. Benefits of the treatment were discussed including headache and migraine reduction. Risks of the procedure were reviewed including but not limited to pain, bruising, bleeding, infection, and weakness of muscles injected or those distal to injection sites. Alternatives were discussed. The patient voiced understanding of the risks and benefits. All questions answered and patient consented to proceed.    Procedural Pause: Procedural pause was conducted to verify correct patient identity, procedure to be performed, correct side and site, correct patient position, and special requirements. Appropriate hand hygiene was utilized, and each injection site was prepped with alcohol wipes or Chloraprep swab.     Procedure Details:   200 units of onabotulinumtoxinA was diluted in 4 mL 0.9% normal saline.   A total of 150 units of onabotulinumtoxinA were injected using 30 gauge 0.5 inch needles into the muscles listed below. 50 units of onabotulinumtoxinA were wasted.     Injection Sites: Total = 150 units onabotulinumtoxinA     Procerus muscles - 5 units into the procerus muscle (5 units total)     muscles - 5 units into the left  muscle and 5 units into the right  muscle (1 injection site per muscle) (10 units total)    Frontalis muscles - 5 units into the left superior frontalis muscle and 5 units into the right superior frontalis muscle (2 injection sites per muscle) (10 units total)    Temporalis muscles - 12.5 units into the left temporalis muscle and 12.5 units into the right temporalis muscle (2 injection sites per muscle) (25 units total)    Occipitalis muscles - 12.5 units into the left occipitalis muscle and 12.5 units into the right occipitalis muscle (2 injection sites per muscle) (25 units total)    Splenius Capitis muscles - 12.5 units into the left splenius  capitis muscle and 12.5 units into the right splenius capitis muscle (2 injection sites per muscle, divided into 2/3 anteriorly and 1/3 posteriorly) (25 units total)      Trapezius muscles - 25 units into the left trapezius muscle and 25 units into the right trapezius muscle (3 injection sites per muscle, divided into 5 units, 10 units, 10 units, medial to lateral) (50 units total)      Patient tolerated the procedure well without immediate complications. She will follow up in clinic for assessment of the effectiveness of treatment. She did not report any change in her pain level after the botulinumtoxinA injection procedure.      Sonu Servin PA-C  Headache Neurology  [facility] Neurology [facility]      Again, thank you for allowing me to participate in the care of your patient.        Sincerely,        SONU SERVIN PA-C

## 2024-10-25 NOTE — PROGRESS NOTES
[facility]  Botulinum Toxin Procedure    Angela Servin PA-C  Headache Neurology    October 25, 2024    Procedure: OnabotulinumtoxinA injections for chronic migraine  Indication: Chronic migraine    Magdalene Cohen suffers from severe intractable headaches. She was referred by Angela Servin PA-C for onabotulinumtoxinA injections for headache.       Her headaches meet criteria for chronic migraine. She describes her headaches as starting around and behind the left eye, then can spread to also affect the right side. Headaches are a throbbing pressure. Typically her headaches are a 5-6/10, but they can reach 9/10 when very severe. Headaches last between 4-14 days in duration. She has associated nausea with rare vomiting, photophobia, phonophobia, visual aura.     Prior to initiation of botulinum toxin injections, Magdalene reported 15/30 headache days per month, with 8/30 severe headache days per month. Her headaches are quite disabling and often interfere with her ability to function normally.    Their last round of botulinum toxin injections was on 8/2/2024.    Magdalene reports <4 headache days per month currently, with 1-2 severe headache days per month. She has noticed a wearing off phenomenon prior to this round of botulinum toxin injections, lasting 2-3 weeks.     Magdalene reports the following benefits of botulinum toxin injections from their last round: Acute medications were more effective.     She has attempted other migraine prophylactic treatments in the past, which have included: bupropion, fluoxetine, mirtazapine, venlafaxine, lamotrigine. Beta blockers contraindicated due to asthma and topiramate interacts with lamotrigine.     She currently takes duloxetine, mirtazapine, lamotrigine, bupropion without headache benefit.      The procedure was explained to the patient. Benefits of the treatment were discussed including headache and migraine reduction. Risks of the procedure were reviewed including  but not limited to pain, bruising, bleeding, infection, and weakness of muscles injected or those distal to injection sites. Alternatives were discussed. The patient voiced understanding of the risks and benefits. All questions answered and patient consented to proceed.    Procedural Pause: Procedural pause was conducted to verify correct patient identity, procedure to be performed, correct side and site, correct patient position, and special requirements. Appropriate hand hygiene was utilized, and each injection site was prepped with alcohol wipes or Chloraprep swab.     Procedure Details:   200 units of onabotulinumtoxinA was diluted in 4 mL 0.9% normal saline.   A total of 150 units of onabotulinumtoxinA were injected using 30 gauge 0.5 inch needles into the muscles listed below. 50 units of onabotulinumtoxinA were wasted.     Injection Sites: Total = 150 units onabotulinumtoxinA     Procerus muscles - 5 units into the procerus muscle (5 units total)     muscles - 5 units into the left  muscle and 5 units into the right  muscle (1 injection site per muscle) (10 units total)    Frontalis muscles - 5 units into the left superior frontalis muscle and 5 units into the right superior frontalis muscle (2 injection sites per muscle) (10 units total)    Temporalis muscles - 12.5 units into the left temporalis muscle and 12.5 units into the right temporalis muscle (2 injection sites per muscle) (25 units total)    Occipitalis muscles - 12.5 units into the left occipitalis muscle and 12.5 units into the right occipitalis muscle (2 injection sites per muscle) (25 units total)    Splenius Capitis muscles - 12.5 units into the left splenius capitis muscle and 12.5 units into the right splenius capitis muscle (2 injection sites per muscle, divided into 2/3 anteriorly and 1/3 posteriorly) (25 units total)      Trapezius muscles - 25 units into the left trapezius muscle and 25 units into the right  trapezius muscle (3 injection sites per muscle, divided into 5 units, 10 units, 10 units, medial to lateral) (50 units total)      Patient tolerated the procedure well without immediate complications. She will follow up in clinic for assessment of the effectiveness of treatment. She did not report any change in her pain level after the botulinumtoxinA injection procedure.      Angela Servin PA-C  Headache Neurology  [facility] Neurology [facility]

## 2025-03-09 ENCOUNTER — HEALTH MAINTENANCE LETTER (OUTPATIENT)
Age: 30
End: 2025-03-09

## 2025-04-04 DIAGNOSIS — G43.E09 CHRONIC MIGRAINE WITH AURA WITHOUT STATUS MIGRAINOSUS, NOT INTRACTABLE: ICD-10-CM

## 2025-04-07 RX ORDER — METOCLOPRAMIDE 10 MG/1
10 TABLET ORAL 3 TIMES DAILY PRN
Qty: 20 TABLET | Refills: 3 | Status: SHIPPED | OUTPATIENT
Start: 2025-04-07

## 2025-04-07 NOTE — TELEPHONE ENCOUNTER
Routing refill request to provider for review/approval because:  Drug interaction warning- trifluoperazine (STELAZINE) 2 MG tablet     Zainab RN, BSN  [facility]